# Patient Record
Sex: MALE | Race: WHITE | NOT HISPANIC OR LATINO | Employment: OTHER | URBAN - METROPOLITAN AREA
[De-identification: names, ages, dates, MRNs, and addresses within clinical notes are randomized per-mention and may not be internally consistent; named-entity substitution may affect disease eponyms.]

---

## 2018-07-25 ENCOUNTER — APPOINTMENT (OUTPATIENT)
Dept: PREADMISSION TESTING | Facility: HOSPITAL | Age: 73
End: 2018-07-25

## 2018-07-25 VITALS
WEIGHT: 191.6 LBS | RESPIRATION RATE: 20 BRPM | HEART RATE: 100 BPM | HEIGHT: 63 IN | DIASTOLIC BLOOD PRESSURE: 75 MMHG | OXYGEN SATURATION: 97 % | SYSTOLIC BLOOD PRESSURE: 168 MMHG | BODY MASS INDEX: 33.95 KG/M2 | TEMPERATURE: 98 F

## 2018-07-25 LAB
ANION GAP SERPL CALCULATED.3IONS-SCNC: 15.3 MMOL/L
BUN BLD-MCNC: 24 MG/DL (ref 8–23)
BUN/CREAT SERPL: 17.8 (ref 7–25)
CALCIUM SPEC-SCNC: 9.4 MG/DL (ref 8.6–10.5)
CHLORIDE SERPL-SCNC: 101 MMOL/L (ref 98–107)
CO2 SERPL-SCNC: 25.7 MMOL/L (ref 22–29)
CREAT BLD-MCNC: 1.35 MG/DL (ref 0.76–1.27)
DEPRECATED RDW RBC AUTO: 57.4 FL (ref 37–54)
ERYTHROCYTE [DISTWIDTH] IN BLOOD BY AUTOMATED COUNT: 20 % (ref 11.5–14.5)
GFR SERPL CREATININE-BSD FRML MDRD: 52 ML/MIN/1.73
GLUCOSE BLD-MCNC: 137 MG/DL (ref 65–99)
HCT VFR BLD AUTO: 34 % (ref 40.4–52.2)
HGB BLD-MCNC: 10 G/DL (ref 13.7–17.6)
MCH RBC QN AUTO: 23 PG (ref 27–32.7)
MCHC RBC AUTO-ENTMCNC: 29.4 G/DL (ref 32.6–36.4)
MCV RBC AUTO: 78.3 FL (ref 79.8–96.2)
PLATELET # BLD AUTO: 171 10*3/MM3 (ref 140–500)
PMV BLD AUTO: 10.1 FL (ref 6–12)
POTASSIUM BLD-SCNC: 4 MMOL/L (ref 3.5–5.2)
RBC # BLD AUTO: 4.34 10*6/MM3 (ref 4.6–6)
SODIUM BLD-SCNC: 142 MMOL/L (ref 136–145)
WBC NRBC COR # BLD: 5.34 10*3/MM3 (ref 4.5–10.7)

## 2018-07-25 PROCEDURE — 93010 ELECTROCARDIOGRAM REPORT: CPT | Performed by: INTERNAL MEDICINE

## 2018-07-25 PROCEDURE — 85027 COMPLETE CBC AUTOMATED: CPT | Performed by: OTOLARYNGOLOGY

## 2018-07-25 PROCEDURE — 80076 HEPATIC FUNCTION PANEL: CPT | Performed by: OTOLARYNGOLOGY

## 2018-07-25 PROCEDURE — 93005 ELECTROCARDIOGRAM TRACING: CPT

## 2018-07-25 PROCEDURE — 36415 COLL VENOUS BLD VENIPUNCTURE: CPT

## 2018-07-25 PROCEDURE — 80048 BASIC METABOLIC PNL TOTAL CA: CPT | Performed by: OTOLARYNGOLOGY

## 2018-07-25 RX ORDER — ISOSORBIDE MONONITRATE 60 MG/1
60 TABLET, EXTENDED RELEASE ORAL DAILY
COMMUNITY

## 2018-07-25 RX ORDER — ACETAMINOPHEN,DIPHENHYDRAMINE HCL 500; 25 MG/1; MG/1
1 TABLET, FILM COATED ORAL NIGHTLY PRN
COMMUNITY
End: 2021-01-15

## 2018-07-25 RX ORDER — FUROSEMIDE 80 MG
160 TABLET ORAL 2 TIMES DAILY
COMMUNITY
Start: 2018-02-16

## 2018-07-25 RX ORDER — LISINOPRIL 40 MG/1
40 TABLET ORAL DAILY
COMMUNITY
Start: 2018-02-17 | End: 2021-01-15

## 2018-07-25 RX ORDER — ATORVASTATIN CALCIUM 40 MG/1
40 TABLET, FILM COATED ORAL NIGHTLY
COMMUNITY
Start: 2018-02-16

## 2018-07-25 RX ORDER — OMEPRAZOLE 40 MG/1
40 CAPSULE, DELAYED RELEASE ORAL DAILY
COMMUNITY

## 2018-07-25 RX ORDER — CARVEDILOL 12.5 MG/1
37.5 TABLET ORAL EVERY 12 HOURS
COMMUNITY
Start: 2018-02-20

## 2018-07-25 RX ORDER — ASPIRIN 81 MG/1
81 TABLET, CHEWABLE ORAL DAILY
COMMUNITY
End: 2021-01-15

## 2018-07-25 RX ORDER — DABIGATRAN ETEXILATE 150 MG/1
150 CAPSULE ORAL 2 TIMES DAILY
COMMUNITY
Start: 2018-02-23 | End: 2021-01-15

## 2018-07-25 RX ORDER — ALLOPURINOL 300 MG/1
300 TABLET ORAL DAILY
COMMUNITY
Start: 2018-02-17

## 2018-07-25 NOTE — DISCHARGE INSTRUCTIONS
Take the following medications the morning of surgery with a small sip of water: TAKE CARVEDILOL AND ISOSORBIDE AT HOME;  BRING LISINOPRIL BOTTLE IN TO SURGERY NURSE AND SHE WILL TAKE BLOOD PRESSURE AGAIN TO SEE IF YOU NEED IT.  STOP BEFORE SURGERY ANY ANTIINFLAMMATORY, HERBAL, VITAMIN. ASPIRIN (DONE) AND PRADAXA (7/30 PER PT).  ARRIVE TO THE OUTPATIENT SURGERY DESK THE DAY OF YOUR SURGERY BY 8AM.  CALL DR NEELY OFFICE TO GET ALCOHOL TAPERING ADVICE PRIOR TO SURGERY!    General Instructions:  • Do not eat solid food after midnight the night before surgery.  • You may drink clear liquids day of surgery but must stop at least one hour before your hospital arrival time.  • It is beneficial for you to have a clear drink that contains carbohydrates the day of surgery.  We suggest a 12 to 20 ounce bottle of Gatorade or Powerade for non-diabetic patients or a 12 to 20 ounce bottle of G2 or Powerade Zero for diabetic patients. (Pediatric patients, are not advised to drink a 12 to 20 ounce carbohydrate drink)    Clear liquids are liquids you can see through.  Nothing red in color.     Plain water                               Sports drinks  Sodas                                   Gelatin (Jell-O)  Fruit juices without pulp such as white grape juice and apple juice  Popsicles that contain no fruit or yogurt  Tea or coffee (no cream or milk added)  Gatorade / Powerade  G2 / Powerade Zero    • Infants may have breast milk up to four hours before surgery.  • Infants drinking formula may drink formula up to six hours before surgery.   • Patients who avoid smoking, chewing tobacco and alcohol for 4 weeks prior to surgery have a reduced risk of post-operative complications.  Quit smoking as many days before surgery as you can.  • Do not smoke, use chewing tobacco or drink alcohol the day of surgery.   • If applicable bring your C-PAP/ BI-PAP machine.  • Bring any papers given to you in the doctor’s office.  • Wear clean  comfortable clothes and socks.  • Do not wear contact lenses or make-up.  Bring a case for your glasses.   • Bring crutches or walker if applicable.  • Remove all piercings.  Leave jewelry and any other valuables at home.  • Hair extensions with metal clips must be removed prior to surgery.  • The Pre-Admission Testing nurse will instruct you to bring medications if unable to obtain an accurate list in Pre-Admission Testing.        If you were given a blood bank ID arm band remember to bring it with you the day of surgery.    Preventing a Surgical Site Infection:  • For 2 to 3 days before surgery, avoid shaving with a razor because the razor can irritate skin and make it easier to develop an infection.    • Any areas of open skin can increase the risk of a post-operative wound infection by allowing bacteria to enter and travel throughout the body.  Notify your surgeon if you have any skin wounds / rashes even if it is not near the expected surgical site.  The area will need assessed to determine if surgery should be delayed until it is healed.  • The night prior to surgery sleep in a clean bed with clean clothing.  Do not allow pets to sleep with you.  • Shower on the morning of surgery using a fresh bar of anti-bacterial soap (such as Dial) and clean washcloth.  Dry with a clean towel and dress in clean clothing.  • Ask your surgeon if you will be receiving antibiotics prior to surgery.  • Make sure you, your family, and all healthcare providers clean their hands with soap and water or an alcohol based hand  before caring for you or your wound.    Day of surgery:  Upon arrival, a Pre-op nurse and Anesthesiologist will review your health history, obtain vital signs, and answer questions you may have.  The only belongings needed at this time will be your home medications and if applicable your C-PAP/BI-PAP machine.  If you are staying overnight your family can leave the rest of your belongings in the car and  bring them to your room later.  A Pre-op nurse will start an IV and you may receive medication in preparation for surgery, including something to help you relax.  Your family will be able to see you in the Pre-op area.  While you are in surgery your family should notify the waiting room  if they leave the waiting room area and provide a contact phone number.    Please be aware that surgery does come with discomfort.  We want to make every effort to control your discomfort so please discuss any uncontrolled symptoms with your nurse.   Your doctor will most likely have prescribed pain medications.      If you are going home after surgery you will receive individualized written care instructions before being discharged.  A responsible adult must drive you to and from the hospital on the day of your surgery and stay with you for 24 hours.    If you are staying overnight following surgery, you will be transported to your hospital room following the recovery period.  [facility] has all private rooms.    You have received a list of surgical assistants for your reference.  If you have any questions please call Pre-Admission Testing at [phone].  Deductibles and co-payments are collected on the day of service. Please be prepared to pay the required co-pay, deductible or deposit on the day of service as defined by your plan.

## 2018-07-26 LAB
ALBUMIN SERPL-MCNC: 4.8 G/DL (ref 3.5–5.2)
ALP SERPL-CCNC: 187 U/L (ref 39–117)
ALT SERPL W P-5'-P-CCNC: 22 U/L (ref 1–41)
AST SERPL-CCNC: 25 U/L (ref 1–40)
BILIRUB CONJ SERPL-MCNC: 0.2 MG/DL (ref 0–0.3)
BILIRUB INDIRECT SERPL-MCNC: 0.4 MG/DL
BILIRUB SERPL-MCNC: 0.6 MG/DL (ref 0.1–1.2)
PROT SERPL-MCNC: 7.4 G/DL (ref 6–8.5)

## 2018-08-02 ENCOUNTER — ANESTHESIA EVENT (OUTPATIENT)
Dept: PERIOP | Facility: HOSPITAL | Age: 73
End: 2018-08-02

## 2018-08-02 ENCOUNTER — HOSPITAL ENCOUNTER (OUTPATIENT)
Facility: HOSPITAL | Age: 73
Setting detail: HOSPITAL OUTPATIENT SURGERY
Discharge: HOME | End: 2018-08-02
Attending: OTOLARYNGOLOGY | Admitting: ANESTHESIOLOGY

## 2018-08-02 ENCOUNTER — ANESTHESIA (OUTPATIENT)
Dept: PERIOP | Facility: HOSPITAL | Age: 73
End: 2018-08-02

## 2018-08-02 VITALS
DIASTOLIC BLOOD PRESSURE: 97 MMHG | SYSTOLIC BLOOD PRESSURE: 174 MMHG | OXYGEN SATURATION: 95 % | RESPIRATION RATE: 16 BRPM | TEMPERATURE: 97.7 F | HEART RATE: 99 BPM

## 2018-08-02 DIAGNOSIS — T85.398A: ICD-10-CM

## 2018-08-02 PROCEDURE — 25010000002 MIDAZOLAM PER 1 MG: Performed by: ANESTHESIOLOGY

## 2018-08-02 PROCEDURE — 25010000002 FENTANYL CITRATE (PF) 100 MCG/2ML SOLUTION: Performed by: NURSE ANESTHETIST, CERTIFIED REGISTERED

## 2018-08-02 PROCEDURE — 25010000002 FENTANYL CITRATE (PF) 100 MCG/2ML SOLUTION: Performed by: ANESTHESIOLOGY

## 2018-08-02 PROCEDURE — V2628 FABRICATION & FITTING: HCPCS | Performed by: OTOLARYNGOLOGY

## 2018-08-02 PROCEDURE — 25010000002 PROPOFOL 10 MG/ML EMULSION: Performed by: NURSE ANESTHETIST, CERTIFIED REGISTERED

## 2018-08-02 PROCEDURE — 25010000002 ONDANSETRON PER 1 MG: Performed by: NURSE ANESTHETIST, CERTIFIED REGISTERED

## 2018-08-02 PROCEDURE — 88304 TISSUE EXAM BY PATHOLOGIST: CPT | Performed by: OTOLARYNGOLOGY

## 2018-08-02 PROCEDURE — 25010000002 PHENYLEPHRINE PER 1 ML: Performed by: NURSE ANESTHETIST, CERTIFIED REGISTERED

## 2018-08-02 PROCEDURE — 63710000001 PROMETHAZINE PER 25 MG: Performed by: ANESTHESIOLOGY

## 2018-08-02 DEVICE — IMPLANTABLE DEVICE
Type: IMPLANTABLE DEVICE | Status: FUNCTIONAL
Brand: LARGE STERILE EYE CONFORMER (PMMA)

## 2018-08-02 RX ORDER — ACETAMINOPHEN 650 MG/1
650 SUPPOSITORY RECTAL ONCE AS NEEDED
Status: DISCONTINUED | OUTPATIENT
Start: 2018-08-02 | End: 2018-08-02 | Stop reason: HOSPADM

## 2018-08-02 RX ORDER — SODIUM CHLORIDE, SODIUM LACTATE, POTASSIUM CHLORIDE, CALCIUM CHLORIDE 600; 310; 30; 20 MG/100ML; MG/100ML; MG/100ML; MG/100ML
INJECTION, SOLUTION INTRAVENOUS CONTINUOUS PRN
Status: DISCONTINUED | OUTPATIENT
Start: 2018-08-02 | End: 2018-08-02

## 2018-08-02 RX ORDER — MIDAZOLAM HYDROCHLORIDE 1 MG/ML
2 INJECTION INTRAMUSCULAR; INTRAVENOUS
Status: DISCONTINUED | OUTPATIENT
Start: 2018-08-02 | End: 2018-08-02 | Stop reason: HOSPADM

## 2018-08-02 RX ORDER — FENTANYL CITRATE 50 UG/ML
50 INJECTION, SOLUTION INTRAMUSCULAR; INTRAVENOUS
Status: DISCONTINUED | OUTPATIENT
Start: 2018-08-02 | End: 2018-08-02 | Stop reason: HOSPADM

## 2018-08-02 RX ORDER — MIDAZOLAM HYDROCHLORIDE 1 MG/ML
1 INJECTION INTRAMUSCULAR; INTRAVENOUS
Status: DISCONTINUED | OUTPATIENT
Start: 2018-08-02 | End: 2018-08-02 | Stop reason: HOSPADM

## 2018-08-02 RX ORDER — SODIUM CHLORIDE 9 MG/ML
50 INJECTION, SOLUTION INTRAVENOUS CONTINUOUS PRN
Status: DISCONTINUED | OUTPATIENT
Start: 2018-08-02 | End: 2018-08-02 | Stop reason: HOSPADM

## 2018-08-02 RX ORDER — ROCURONIUM BROMIDE 10 MG/ML
INJECTION, SOLUTION INTRAVENOUS AS NEEDED
Status: DISCONTINUED | OUTPATIENT
Start: 2018-08-02 | End: 2018-08-02 | Stop reason: SURG

## 2018-08-02 RX ORDER — DIPHENHYDRAMINE HYDROCHLORIDE 50 MG/ML
12.5 INJECTION INTRAMUSCULAR; INTRAVENOUS
Status: DISCONTINUED | OUTPATIENT
Start: 2018-08-02 | End: 2018-08-02 | Stop reason: HOSPADM

## 2018-08-02 RX ORDER — LIDOCAINE HYDROCHLORIDE 10 MG/ML
0.5 INJECTION, SOLUTION EPIDURAL; INFILTRATION; INTRACAUDAL; PERINEURAL ONCE AS NEEDED
Status: COMPLETED | OUTPATIENT
Start: 2018-08-02 | End: 2018-08-02

## 2018-08-02 RX ORDER — FENTANYL CITRATE 50 UG/ML
INJECTION, SOLUTION INTRAMUSCULAR; INTRAVENOUS AS NEEDED
Status: DISCONTINUED | OUTPATIENT
Start: 2018-08-02 | End: 2018-08-02 | Stop reason: SURG

## 2018-08-02 RX ORDER — PROMETHAZINE HYDROCHLORIDE 25 MG/ML
6.25 INJECTION, SOLUTION INTRAMUSCULAR; INTRAVENOUS ONCE AS NEEDED
Status: COMPLETED | OUTPATIENT
Start: 2018-08-02 | End: 2018-08-02

## 2018-08-02 RX ORDER — SODIUM CHLORIDE 0.9 % (FLUSH) 0.9 %
1-10 SYRINGE (ML) INJECTION AS NEEDED
Status: DISCONTINUED | OUTPATIENT
Start: 2018-08-02 | End: 2018-08-02 | Stop reason: HOSPADM

## 2018-08-02 RX ORDER — MAGNESIUM HYDROXIDE 1200 MG/15ML
LIQUID ORAL AS NEEDED
Status: DISCONTINUED | OUTPATIENT
Start: 2018-08-02 | End: 2018-08-02 | Stop reason: HOSPADM

## 2018-08-02 RX ORDER — PROPOFOL 10 MG/ML
VIAL (ML) INTRAVENOUS AS NEEDED
Status: DISCONTINUED | OUTPATIENT
Start: 2018-08-02 | End: 2018-08-02 | Stop reason: SURG

## 2018-08-02 RX ORDER — HYDROCODONE BITARTRATE AND ACETAMINOPHEN 7.5; 325 MG/1; MG/1
1 TABLET ORAL ONCE AS NEEDED
Status: DISCONTINUED | OUTPATIENT
Start: 2018-08-02 | End: 2018-08-02 | Stop reason: HOSPADM

## 2018-08-02 RX ORDER — ACETAMINOPHEN 325 MG/1
650 TABLET ORAL ONCE
Status: COMPLETED | OUTPATIENT
Start: 2018-08-02 | End: 2018-08-02

## 2018-08-02 RX ORDER — BACITRACIN 500 [USP'U]/G
OINTMENT OPHTHALMIC AS NEEDED
Status: DISCONTINUED | OUTPATIENT
Start: 2018-08-02 | End: 2018-08-02 | Stop reason: HOSPADM

## 2018-08-02 RX ORDER — ERYTHROMYCIN 5 MG/G
OINTMENT OPHTHALMIC
Qty: 3.5 G | Refills: 1 | Status: SHIPPED | OUTPATIENT
Start: 2018-08-02 | End: 2021-01-15

## 2018-08-02 RX ORDER — ONDANSETRON 2 MG/ML
INJECTION INTRAMUSCULAR; INTRAVENOUS AS NEEDED
Status: DISCONTINUED | OUTPATIENT
Start: 2018-08-02 | End: 2018-08-02 | Stop reason: SURG

## 2018-08-02 RX ORDER — NALOXONE HCL 0.4 MG/ML
0.4 VIAL (ML) INJECTION AS NEEDED
Status: DISCONTINUED | OUTPATIENT
Start: 2018-08-02 | End: 2018-08-02 | Stop reason: HOSPADM

## 2018-08-02 RX ORDER — HYDROCODONE BITARTRATE AND ACETAMINOPHEN 7.5; 325 MG/1; MG/1
1 TABLET ORAL EVERY 6 HOURS PRN
Qty: 15 TABLET | Refills: 0 | Status: SHIPPED | OUTPATIENT
Start: 2018-08-02 | End: 2021-01-15

## 2018-08-02 RX ORDER — PROMETHAZINE HYDROCHLORIDE 25 MG/1
25 TABLET ORAL ONCE AS NEEDED
Status: COMPLETED | OUTPATIENT
Start: 2018-08-02 | End: 2018-08-02

## 2018-08-02 RX ORDER — ACETAMINOPHEN 325 MG/1
650 TABLET ORAL ONCE AS NEEDED
Status: DISCONTINUED | OUTPATIENT
Start: 2018-08-02 | End: 2018-08-02 | Stop reason: HOSPADM

## 2018-08-02 RX ORDER — PROMETHAZINE HYDROCHLORIDE 25 MG/1
25 SUPPOSITORY RECTAL ONCE AS NEEDED
Status: COMPLETED | OUTPATIENT
Start: 2018-08-02 | End: 2018-08-02

## 2018-08-02 RX ADMIN — HYDROCODONE BITARTRATE AND ACETAMINOPHEN 1 TABLET: 7.5; 325 TABLET ORAL at 14:10

## 2018-08-02 RX ADMIN — FENTANYL CITRATE 50 MCG: 50 INJECTION INTRAMUSCULAR; INTRAVENOUS at 12:05

## 2018-08-02 RX ADMIN — PHENYLEPHRINE HYDROCHLORIDE 100 MCG: 10 INJECTION INTRAVENOUS at 11:11

## 2018-08-02 RX ADMIN — SODIUM CHLORIDE: 9 INJECTION, SOLUTION INTRAVENOUS at 10:14

## 2018-08-02 RX ADMIN — ONDANSETRON 4 MG: 2 INJECTION INTRAMUSCULAR; INTRAVENOUS at 11:27

## 2018-08-02 RX ADMIN — MIDAZOLAM 1 MG: 1 INJECTION INTRAMUSCULAR; INTRAVENOUS at 08:28

## 2018-08-02 RX ADMIN — PHENYLEPHRINE HYDROCHLORIDE 100 MCG: 10 INJECTION INTRAVENOUS at 11:15

## 2018-08-02 RX ADMIN — LIDOCAINE HYDROCHLORIDE 0.5 ML: 10 INJECTION, SOLUTION EPIDURAL; INFILTRATION; INTRACAUDAL; PERINEURAL at 08:18

## 2018-08-02 RX ADMIN — PROPOFOL 100 MG: 10 INJECTION, EMULSION INTRAVENOUS at 10:14

## 2018-08-02 RX ADMIN — PROMETHAZINE HYDROCHLORIDE 25 MG: 25 TABLET ORAL at 12:32

## 2018-08-02 RX ADMIN — ACETAMINOPHEN 650 MG: 325 TABLET, FILM COATED ORAL at 08:26

## 2018-08-02 RX ADMIN — FENTANYL CITRATE 25 MCG: 50 INJECTION INTRAMUSCULAR; INTRAVENOUS at 10:14

## 2018-08-02 RX ADMIN — SODIUM CHLORIDE 50 ML/HR: 9 INJECTION, SOLUTION INTRAVENOUS at 08:18

## 2018-08-02 RX ADMIN — SUGAMMADEX 170 MG: 100 INJECTION, SOLUTION INTRAVENOUS at 11:31

## 2018-08-02 RX ADMIN — SODIUM CHLORIDE: 9 INJECTION, SOLUTION INTRAVENOUS at 11:33

## 2018-08-02 RX ADMIN — ROCURONIUM BROMIDE 35 MG: 10 INJECTION INTRAVENOUS at 10:14

## 2018-08-02 RX ADMIN — MIDAZOLAM 1 MG: 1 INJECTION INTRAMUSCULAR; INTRAVENOUS at 08:45

## 2018-08-02 RX ADMIN — FENTANYL CITRATE 50 MCG: 50 INJECTION INTRAMUSCULAR; INTRAVENOUS at 12:10

## 2018-08-02 NOTE — H&P
History & Physical       Patient: Moe Lora    Date of Admission: 8/2/2018  7:18 AM    YOB: 1945    Medical Record Number: [id number]      Chief Complaints: bleeding and discomfort from orbital implant.       History of Present Illness: 73 y.o. male presents with exposure of hydroxyapatite implant, left eye. No new meds/health problems since office visit      Allergies:   Allergies   Allergen Reactions   • Azithromycin Dermatitis   • Clopidogrel Anaphylaxis   • Codeine Dermatitis   • Cephalexin Dermatitis     Pt unsure of this allergy       10 point review of systems negative, except pertaining to the HPI    Medications:   Home Medications:  No current facility-administered medications on file prior to encounter.      No current outpatient prescriptions on file prior to encounter.     Current Medications:  Scheduled Meds:  Continuous Infusions:  No current facility-administered medications for this encounter.   PRN Meds:.    Past Medical History:   Diagnosis Date   • AF (atrial fibrillation) (CMS/[facility])     PT TO HAVE CARDIAC ABLATION AFTER THIS EYE SURGERY 8/2018, BY DR CORTEZ   • AICD (automatic cardioverter/defibrillator) present    • Alcohol abuse    • Arthritis    • Borderline diabetes    • Bradycardia     AICD   • Carotid stenosis    • CHF (congestive heart failure) (CMS/[facility])    • CKD (chronic kidney disease)    • Coronary artery disease    • Depression    • GERD (gastroesophageal reflux disease)    • Hearing loss     HEARING AID BILAT   • Hx of CABG    • Hyperlipidemia    • Hypertension    • Ischemic cardiomyopathy    • LBBB (left bundle branch block)     PER [facility] HX   • MI (myocardial infarction)     SEVERAL   • MRSA carrier     PT STATES NASAL SWAB POSITIVE AT [facility], INFECTION CONTROL CALLED/MARTHA   • On anticoagulant therapy    • Paresthesia     PT WITH OCCASIONAL FINGERTIP TINGLING, CONSTANT NUMBNESS BILAT FEET - PT STATES WORKUP IN PROGRESS FOR VASCULAR BYPASS   • Prosthetic eye globe      LEFT   • Pulmonary nodule     biopsied 2018?   • Sleep apnea with use of continuous positive airway pressure (CPAP)    • Stroke (CMS/HCC)     HAD BRAIN BLEED AFTER HIGH DOSES WARFARIN   • Valvular heart disease    • Vascular disease     LOWER EXTREMITIES        Past Surgical History:   Procedure Laterality Date   • CARDIAC DEFIBRILLATOR PLACEMENT  02/2018    DEAM PLACED FIRST ONE THAT BECAME INFECTED; SECOND PLACED BY DR LAZARO   • CAROTID ENDARTERECTOMY Left    • CORONARY ARTERY BYPASS GRAFT  2006    ABE   • INTRAOCULAR PROSTHESES INSERTION      5 EYE SURGERIES, DR MERIDA [facility]        Social History     Occupational History   • Not on file.     Social History Main Topics   • Smoking status: Former Smoker     Packs/day: 3.00     Years: 40.00     Types: Cigarettes     Quit date: 2005   • Smokeless tobacco: Never Used   • Alcohol use 33.6 oz/week     56 Cans of beer per week      Comment: 8 BEERS A DAY   • Drug use: No   • Sexual activity: Defer    Social History     Social History Narrative   • No narrative on file        Family History   Problem Relation Age of Onset   • Mental illness Neg Hx            Physical Exam   Constitutional: Alert, cooperative, in no acute distress    Head: Normocephalic.   Eyes:   hydroxyapatite implant exposure, left eye  Neck: Normal range of motion.   Cardiovascular: Normal rate.    Pulmonary/Chest: Effort normal.   Neurological: Alert.   Skin: Skin is warm.   Psychiatric: Normal mood and affect.       Assessment/Plan:  The patient voiced understanding of the risks, benefits, and alternative forms of treatment that were discussed and the patient consents to proceed with LEFT REMOVAL OF ORBITAL IMPLANT LEFT SECONDARY VOLUME IMPLANT.       Heber Church Jr, MD

## 2018-08-02 NOTE — OP NOTE
Operative Note    Moe Lora  08/02/18      Pre-op Diagnosis: Extruding left hydroxyapatite anophthalmic implant    Post-op Diagnosis: Same    Procedure(s):  Enucleation of left socket, with reconstruction using autologous dermis-fat graft, and Frost suture OS    Surgeon(s):  Moe Arguello MD      Assistant(s), Heber Church MD, Neo Malhotra MD    Anesthesia: General  MAC    This patient presents with an extruding implant from the previously eviscerated left orbit.    The patient was taken to the operating room where general anesthesia was induced. The patient was then prepped and draped in the usual manner for surgery.    A conjunctival incision was placed horizontally in the left socket and sharp dissection was continued through dense cicatricial tissue around the sclera, filled with a hydroxyapatite implant.  An effort was made to preserve the extraocular muscles as they were released from the sclera, despite the dense cicatrization.  The optic nerve was severed posteriorly, and the implant and sclera were removed.    A dermis-fat graft was then harvested from the umbilical area.  The donor site was closed in multiple layers with 5-0 vicryl and 6-0 chromic sutures.  The graft was then placed into the right orbit where it was sutured with 5-0 vicryl on the anterior surface of the dermis.      A Miles suture was then placed in the orbit after placing the anophthalmic conformer.    The patient was then awakened and taken from the OR in good condition.    Estimated Blood Loss: <50 mL    Specimens:                none      Drains:   none    Complications: none       Moe Arguello MD    Date: 08/02/18

## 2018-08-02 NOTE — ANESTHESIA PREPROCEDURE EVALUATION
Anesthesia Evaluation     no history of anesthetic complications:  NPO Solid Status: > 8 hours             Airway   Mallampati: III  TM distance: <3 FB  Neck ROM: full  Possible difficult intubation  Dental - normal exam     Pulmonary - normal exam   (+) sleep apnea on CPAP,   (-) wheezes  Cardiovascular     ECG reviewed  Rhythm: irregular    (+) pacemaker ICD, hypertension, past MI , CAD, CABG 3-6 Months, dysrhythmias, CHF, PVD, hyperlipidemia,  carotid artery disease  (-) murmur, carotid bruits    ROS comment: Sinus w ectopic atrial beats, LBB  medtronic ICD- shouldn't need interrogation  Low EF    Neuro/Psych  (+) CVA, psychiatric history,     GI/Hepatic/Renal/Endo    (+)  GERD,  renal disease CRI,     ROS Comment: Elevated BUN/Cr- likely from NSAID    Musculoskeletal     Abdominal   (+) obese,    Substance History      OB/GYN          Other                      Anesthesia Plan    ASA 4     general   (  D/W R&B of GA including but not limited to: heart, lung, liver, kidney, neurologic problems, positioning injuries, dental damage, corneal abrasion and TMJ.  .  Needs magnet for ICD, no need for post op interrogation.  Pt understands increased CV risk.)  intravenous induction

## 2018-08-02 NOTE — DISCHARGE INSTRUCTIONS
Restart aspirin on Saturday and the Pradaxa on Sunday verbally per Dr. Arguello.     1) No driving while taking narcotics.   2) Return to school / work after cleared by your surgeon at your post operative visit  3) May shower / sponge bathe 24 hours after surgery  4) Do not lift / push / pull more than 20 lbs.  5) Apply cold compresses to surgical site as much as possible for 2-3 days after surgery  6) Keep your head elevated on 2-3 pillows such that your head is always elevated above the level of your chest  7) Use either the narcotic prescribed or extra-strength tylenol. Do not take at the same time.   8) Do not use NSAIDs for pain, such as: Advil, Naproxen, Motrin, Aleve and Ibuprofen.  9) There is a plastic conformer underneath your eyelids, this should stay in place at all times. If your lids are sutured shut, this will be reversed by the doctor in the office.

## 2018-08-02 NOTE — ANESTHESIA POSTPROCEDURE EVALUATION
Patient: Moe Lora    Procedure Summary     Date:  08/02/18 Room / Location:   [facility]  [facility]  [facility]    Anesthesia Start:  1008 Anesthesia Stop:  1155    Procedure:  LEFT REMOVAL OF ORBITAL IMPLANT LEFT SECONDARY VOLUME IMPLANT, DERMIS FAT GRAFT, DISLA SUTURE (Left Eye) Diagnosis:      Surgeon:  Moe Arguello MD Provider:  Hollis Knight MD    Anesthesia Type:  general ASA Status:  4          Anesthesia Type: general  Last vitals  BP   (!) 170/101 (08/02/18 1417)   Temp   36.5 °C (97.7 °F) (08/02/18 1330)   Pulse   100 (08/02/18 1417)   Resp   16 (08/02/18 1417)     SpO2   94 % (08/02/18 1417)     Post Anesthesia Care and Evaluation    Patient location during evaluation: bedside  Patient participation: complete - patient participated  Level of consciousness: awake  Pain score: 2  Pain management: adequate  Airway patency: patent  Anesthetic complications: No anesthetic complications    Cardiovascular status: acceptable  Respiratory status: acceptable  Hydration status: acceptable    Comments: BP (!) 170/101 (BP Location: Left arm, Patient Position: Lying)   Pulse 100   Temp 36.5 °C (97.7 °F) (Temporal Artery )   Resp 16   SpO2 94%

## 2018-08-03 LAB
LAB AP CASE REPORT: NORMAL
PATH REPORT.FINAL DX SPEC: NORMAL
PATH REPORT.GROSS SPEC: NORMAL

## 2021-01-14 ENCOUNTER — HOSPITAL ENCOUNTER (INPATIENT)
Facility: HOSPITAL | Age: 76
LOS: 1 days | Discharge: HOME | End: 2021-01-19
Attending: EMERGENCY MEDICINE | Admitting: HOSPITALIST

## 2021-01-14 ENCOUNTER — APPOINTMENT (OUTPATIENT)
Dept: GENERAL RADIOLOGY | Facility: HOSPITAL | Age: 76
End: 2021-01-14

## 2021-01-14 DIAGNOSIS — M54.50 CHRONIC LOW BACK PAIN WITHOUT SCIATICA, UNSPECIFIED BACK PAIN LATERALITY: ICD-10-CM

## 2021-01-14 DIAGNOSIS — A41.9 SEPSIS, DUE TO UNSPECIFIED ORGANISM, UNSPECIFIED WHETHER ACUTE ORGAN DYSFUNCTION PRESENT (HCC): ICD-10-CM

## 2021-01-14 DIAGNOSIS — J12.82 PNEUMONIA DUE TO COVID-19 VIRUS: ICD-10-CM

## 2021-01-14 DIAGNOSIS — U07.1 PNEUMONIA DUE TO COVID-19 VIRUS: ICD-10-CM

## 2021-01-14 DIAGNOSIS — I50.9 CONGESTIVE HEART FAILURE, UNSPECIFIED HF CHRONICITY, UNSPECIFIED HEART FAILURE TYPE (HCC): Primary | ICD-10-CM

## 2021-01-14 DIAGNOSIS — G89.29 CHRONIC LOW BACK PAIN WITHOUT SCIATICA, UNSPECIFIED BACK PAIN LATERALITY: ICD-10-CM

## 2021-01-14 PROBLEM — N18.9 ACUTE KIDNEY INJURY SUPERIMPOSED ON CKD (HCC): Status: ACTIVE | Noted: 2021-01-14

## 2021-01-14 PROBLEM — N17.9 ACUTE KIDNEY INJURY SUPERIMPOSED ON CKD (HCC): Status: ACTIVE | Noted: 2021-01-14

## 2021-01-14 LAB
ANION GAP SERPL CALCULATED.3IONS-SCNC: 11 MMOL/L (ref 5–15)
ARTERIAL PATENCY WRIST A: POSITIVE
ATMOSPHERIC PRESS: ABNORMAL MM[HG]
BASE EXCESS BLDA CALC-SCNC: 8 MMOL/L (ref 0–3)
BASOPHILS # BLD AUTO: 0 10*3/MM3 (ref 0–0.2)
BASOPHILS NFR BLD AUTO: 0.4 % (ref 0–1.5)
BDY SITE: ABNORMAL
BUN SERPL-MCNC: 36 MG/DL (ref 8–23)
BUN/CREAT SERPL: 20.1 (ref 7–25)
CALCIUM SPEC-SCNC: 9.6 MG/DL (ref 8.6–10.5)
CHLORIDE SERPL-SCNC: 93 MMOL/L (ref 98–107)
CO2 BLDA-SCNC: 31.7 MMOL/L (ref 22–29)
CO2 SERPL-SCNC: 33 MMOL/L (ref 22–29)
CREAT SERPL-MCNC: 1.79 MG/DL (ref 0.76–1.27)
CRP SERPL-MCNC: 3.01 MG/DL (ref 0–0.5)
D DIMER PPP FEU-MCNC: 1.01 MG/L (FEU) (ref 0–0.59)
D-LACTATE SERPL-SCNC: 1.9 MMOL/L (ref 0.5–2)
DEPRECATED RDW RBC AUTO: 52.9 FL (ref 37–54)
EOSINOPHIL # BLD AUTO: 0 10*3/MM3 (ref 0–0.4)
EOSINOPHIL NFR BLD AUTO: 0 % (ref 0.3–6.2)
ERYTHROCYTE [DISTWIDTH] IN BLOOD BY AUTOMATED COUNT: 17.3 % (ref 12.3–15.4)
GFR SERPL CREATININE-BSD FRML MDRD: 37 ML/MIN/1.73
GLUCOSE SERPL-MCNC: 189 MG/DL (ref 65–99)
HCO3 BLDA-SCNC: 30.6 MMOL/L (ref 21–28)
HCT VFR BLD AUTO: 37.3 % (ref 37.5–51)
HEMODILUTION: NO
HGB BLD-MCNC: 12.1 G/DL (ref 13–17.7)
HOLD SPECIMEN: NORMAL
HOLD SPECIMEN: NORMAL
INHALED O2 CONCENTRATION: 32 %
LYMPHOCYTES # BLD AUTO: 0.1 10*3/MM3 (ref 0.7–3.1)
LYMPHOCYTES NFR BLD AUTO: 1.7 % (ref 19.6–45.3)
MCH RBC QN AUTO: 28.7 PG (ref 26.6–33)
MCHC RBC AUTO-ENTMCNC: 32.5 G/DL (ref 31.5–35.7)
MCV RBC AUTO: 88.4 FL (ref 79–97)
MODALITY: ABNORMAL
MONOCYTES # BLD AUTO: 0.3 10*3/MM3 (ref 0.1–0.9)
MONOCYTES NFR BLD AUTO: 3.8 % (ref 5–12)
NEUTROPHILS NFR BLD AUTO: 6.5 10*3/MM3 (ref 1.7–7)
NEUTROPHILS NFR BLD AUTO: 94.1 % (ref 42.7–76)
NRBC BLD AUTO-RTO: 0 /100 WBC (ref 0–0.2)
NT-PROBNP SERPL-MCNC: ABNORMAL PG/ML (ref 0–900)
PCO2 BLDA: 35 MM HG (ref 35–48)
PH BLDA: 7.55 PH UNITS (ref 7.35–7.45)
PLATELET # BLD AUTO: 128 10*3/MM3 (ref 140–450)
PMV BLD AUTO: 9.8 FL (ref 6–12)
PO2 BLDA: 68.5 MM HG (ref 83–108)
POTASSIUM SERPL-SCNC: 3.7 MMOL/L (ref 3.5–5.2)
PROCALCITONIN SERPL-MCNC: 0.29 NG/ML (ref 0–0.25)
RBC # BLD AUTO: 4.22 10*6/MM3 (ref 4.14–5.8)
SAO2 % BLDCOA: 95.6 % (ref 94–98)
SODIUM SERPL-SCNC: 137 MMOL/L (ref 136–145)
WBC # BLD AUTO: 6.9 10*3/MM3 (ref 3.4–10.8)
WHOLE BLOOD HOLD SPECIMEN: NORMAL

## 2021-01-14 PROCEDURE — 25010000002 LEVOFLOXACIN PER 250 MG: Performed by: EMERGENCY MEDICINE

## 2021-01-14 PROCEDURE — 71045 X-RAY EXAM CHEST 1 VIEW: CPT

## 2021-01-14 PROCEDURE — 83605 ASSAY OF LACTIC ACID: CPT

## 2021-01-14 PROCEDURE — 93005 ELECTROCARDIOGRAM TRACING: CPT | Performed by: INTERNAL MEDICINE

## 2021-01-14 PROCEDURE — 80048 BASIC METABOLIC PNL TOTAL CA: CPT | Performed by: EMERGENCY MEDICINE

## 2021-01-14 PROCEDURE — 99223 1ST HOSP IP/OBS HIGH 75: CPT | Performed by: INTERNAL MEDICINE

## 2021-01-14 PROCEDURE — 83880 ASSAY OF NATRIURETIC PEPTIDE: CPT | Performed by: INTERNAL MEDICINE

## 2021-01-14 PROCEDURE — 85025 COMPLETE CBC W/AUTO DIFF WBC: CPT | Performed by: EMERGENCY MEDICINE

## 2021-01-14 PROCEDURE — 84145 PROCALCITONIN (PCT): CPT | Performed by: INTERNAL MEDICINE

## 2021-01-14 PROCEDURE — 36600 WITHDRAWAL OF ARTERIAL BLOOD: CPT

## 2021-01-14 PROCEDURE — 99285 EMERGENCY DEPT VISIT HI MDM: CPT

## 2021-01-14 PROCEDURE — 86140 C-REACTIVE PROTEIN: CPT | Performed by: INTERNAL MEDICINE

## 2021-01-14 PROCEDURE — 82803 BLOOD GASES ANY COMBINATION: CPT

## 2021-01-14 PROCEDURE — 87040 BLOOD CULTURE FOR BACTERIA: CPT | Performed by: EMERGENCY MEDICINE

## 2021-01-14 PROCEDURE — 85379 FIBRIN DEGRADATION QUANT: CPT | Performed by: INTERNAL MEDICINE

## 2021-01-14 RX ORDER — LEVOFLOXACIN 5 MG/ML
750 INJECTION, SOLUTION INTRAVENOUS ONCE
Status: COMPLETED | OUTPATIENT
Start: 2021-01-14 | End: 2021-01-14

## 2021-01-14 RX ORDER — IBUPROFEN 400 MG/1
800 TABLET ORAL ONCE
Status: COMPLETED | OUTPATIENT
Start: 2021-01-14 | End: 2021-01-14

## 2021-01-14 RX ORDER — ACETAMINOPHEN 500 MG
1000 TABLET ORAL ONCE
Status: COMPLETED | OUTPATIENT
Start: 2021-01-14 | End: 2021-01-14

## 2021-01-14 RX ORDER — SODIUM CHLORIDE 0.9 % (FLUSH) 0.9 %
10 SYRINGE (ML) INJECTION AS NEEDED
Status: DISCONTINUED | OUTPATIENT
Start: 2021-01-14 | End: 2021-01-19 | Stop reason: HOSPADM

## 2021-01-14 RX ADMIN — ACETAMINOPHEN 1000 MG: 500 TABLET, FILM COATED ORAL at 19:50

## 2021-01-14 RX ADMIN — LEVOFLOXACIN 750 MG: 5 INJECTION, SOLUTION INTRAVENOUS at 19:52

## 2021-01-14 RX ADMIN — IBUPROFEN 800 MG: 400 TABLET, FILM COATED ORAL at 19:50

## 2021-01-14 NOTE — ED NOTES
Patient tested positive for Covid 1 week ago. Patient reports he isn't getting any better. Can't stop coughing and has fever on and off.      Bailey Brito, RN  01/14/21 9848

## 2021-01-15 ENCOUNTER — APPOINTMENT (OUTPATIENT)
Dept: ULTRASOUND IMAGING | Facility: HOSPITAL | Age: 76
End: 2021-01-15

## 2021-01-15 LAB
ALBUMIN SERPL-MCNC: 3.8 G/DL (ref 3.5–5.2)
ALP SERPL-CCNC: 278 U/L (ref 39–117)
ALT SERPL W P-5'-P-CCNC: 19 U/L (ref 1–41)
AST SERPL-CCNC: 26 U/L (ref 1–40)
B PARAPERT DNA SPEC QL NAA+PROBE: NOT DETECTED
B PERT DNA SPEC QL NAA+PROBE: NOT DETECTED
BILIRUB CONJ SERPL-MCNC: 0.6 MG/DL (ref 0–0.3)
BILIRUB INDIRECT SERPL-MCNC: 0.5 MG/DL
BILIRUB SERPL-MCNC: 1.1 MG/DL (ref 0–1.2)
C PNEUM DNA NPH QL NAA+NON-PROBE: NOT DETECTED
CK SERPL-CCNC: 52 U/L (ref 20–200)
CRP SERPL-MCNC: 4.42 MG/DL (ref 0–0.5)
ETHANOL UR QL: <0.01 %
FERRITIN SERPL-MCNC: 2056 NG/ML (ref 30–400)
FLUAV SUBTYP SPEC NAA+PROBE: NOT DETECTED
FLUBV RNA ISLT QL NAA+PROBE: NOT DETECTED
GLUCOSE BLDC GLUCOMTR-MCNC: 111 MG/DL (ref 70–105)
GLUCOSE BLDC GLUCOMTR-MCNC: 124 MG/DL (ref 70–105)
GLUCOSE BLDC GLUCOMTR-MCNC: 174 MG/DL (ref 70–105)
GLUCOSE BLDC GLUCOMTR-MCNC: 197 MG/DL (ref 70–105)
HADV DNA SPEC NAA+PROBE: NOT DETECTED
HCOV 229E RNA SPEC QL NAA+PROBE: NOT DETECTED
HCOV HKU1 RNA SPEC QL NAA+PROBE: NOT DETECTED
HCOV NL63 RNA SPEC QL NAA+PROBE: NOT DETECTED
HCOV OC43 RNA SPEC QL NAA+PROBE: NOT DETECTED
HMPV RNA NPH QL NAA+NON-PROBE: NOT DETECTED
HOLD SPECIMEN: NORMAL
HOLD SPECIMEN: NORMAL
HPIV1 RNA SPEC QL NAA+PROBE: NOT DETECTED
HPIV2 RNA SPEC QL NAA+PROBE: NOT DETECTED
HPIV3 RNA NPH QL NAA+PROBE: NOT DETECTED
HPIV4 P GENE NPH QL NAA+PROBE: NOT DETECTED
IRON 24H UR-MRATE: 18 MCG/DL (ref 59–158)
LDH SERPL-CCNC: 413 U/L (ref 135–225)
M PNEUMO IGG SER IA-ACNC: NOT DETECTED
PROT SERPL-MCNC: 6.1 G/DL (ref 6–8.5)
PTH-INTACT SERPL-MCNC: 240.2 PG/ML (ref 15–65)
QT INTERVAL: 427 MS
RHINOVIRUS RNA SPEC NAA+PROBE: NOT DETECTED
RSV RNA NPH QL NAA+NON-PROBE: NOT DETECTED
SARS-COV-2 RNA NPH QL NAA+NON-PROBE: DETECTED
TSH SERPL DL<=0.05 MIU/L-ACNC: 0.34 UIU/ML (ref 0.27–4.2)
URATE SERPL-MCNC: 7.2 MG/DL (ref 3.4–7)
WHOLE BLOOD HOLD SPECIMEN: NORMAL
WHOLE BLOOD HOLD SPECIMEN: NORMAL

## 2021-01-15 PROCEDURE — 63710000001 INSULIN GLARGINE PER 5 UNITS: Performed by: INTERNAL MEDICINE

## 2021-01-15 PROCEDURE — 83615 LACTATE (LD) (LDH) ENZYME: CPT | Performed by: INTERNAL MEDICINE

## 2021-01-15 PROCEDURE — 82077 ASSAY SPEC XCP UR&BREATH IA: CPT | Performed by: INTERNAL MEDICINE

## 2021-01-15 PROCEDURE — 82550 ASSAY OF CK (CPK): CPT | Performed by: INTERNAL MEDICINE

## 2021-01-15 PROCEDURE — 87040 BLOOD CULTURE FOR BACTERIA: CPT | Performed by: INTERNAL MEDICINE

## 2021-01-15 PROCEDURE — 82962 GLUCOSE BLOOD TEST: CPT

## 2021-01-15 PROCEDURE — 0202U NFCT DS 22 TRGT SARS-COV-2: CPT | Performed by: INTERNAL MEDICINE

## 2021-01-15 PROCEDURE — XW033E5 INTRODUCTION OF REMDESIVIR ANTI-INFECTIVE INTO PERIPHERAL VEIN, PERCUTANEOUS APPROACH, NEW TECHNOLOGY GROUP 5: ICD-10-PCS | Performed by: HOSPITALIST

## 2021-01-15 PROCEDURE — 82728 ASSAY OF FERRITIN: CPT | Performed by: INTERNAL MEDICINE

## 2021-01-15 PROCEDURE — 63710000001 DEXAMETHASONE PER 0.25 MG: Performed by: INTERNAL MEDICINE

## 2021-01-15 PROCEDURE — 84155 ASSAY OF PROTEIN SERUM: CPT | Performed by: INTERNAL MEDICINE

## 2021-01-15 PROCEDURE — G0378 HOSPITAL OBSERVATION PER HR: HCPCS

## 2021-01-15 PROCEDURE — 83970 ASSAY OF PARATHORMONE: CPT | Performed by: INTERNAL MEDICINE

## 2021-01-15 PROCEDURE — 84443 ASSAY THYROID STIM HORMONE: CPT | Performed by: INTERNAL MEDICINE

## 2021-01-15 PROCEDURE — 84165 PROTEIN E-PHORESIS SERUM: CPT | Performed by: INTERNAL MEDICINE

## 2021-01-15 PROCEDURE — 83540 ASSAY OF IRON: CPT | Performed by: INTERNAL MEDICINE

## 2021-01-15 PROCEDURE — 76775 US EXAM ABDO BACK WALL LIM: CPT

## 2021-01-15 PROCEDURE — 80076 HEPATIC FUNCTION PANEL: CPT | Performed by: INTERNAL MEDICINE

## 2021-01-15 PROCEDURE — 84550 ASSAY OF BLOOD/URIC ACID: CPT | Performed by: INTERNAL MEDICINE

## 2021-01-15 PROCEDURE — 86140 C-REACTIVE PROTEIN: CPT | Performed by: INTERNAL MEDICINE

## 2021-01-15 PROCEDURE — 99233 SBSQ HOSP IP/OBS HIGH 50: CPT | Performed by: INTERNAL MEDICINE

## 2021-01-15 RX ORDER — HYDROCODONE BITARTRATE AND ACETAMINOPHEN 7.5; 325 MG/1; MG/1
1 TABLET ORAL EVERY 6 HOURS PRN
Status: DISCONTINUED | OUTPATIENT
Start: 2021-01-15 | End: 2021-01-19 | Stop reason: HOSPADM

## 2021-01-15 RX ORDER — SODIUM CHLORIDE 9 MG/ML
60 INJECTION, SOLUTION INTRAVENOUS CONTINUOUS
Status: DISCONTINUED | OUTPATIENT
Start: 2021-01-15 | End: 2021-01-18

## 2021-01-15 RX ORDER — ISOSORBIDE MONONITRATE 30 MG/1
30 TABLET, EXTENDED RELEASE ORAL DAILY
Status: DISCONTINUED | OUTPATIENT
Start: 2021-01-15 | End: 2021-01-19 | Stop reason: HOSPADM

## 2021-01-15 RX ORDER — ONDANSETRON 2 MG/ML
4 INJECTION INTRAMUSCULAR; INTRAVENOUS EVERY 6 HOURS PRN
Status: DISCONTINUED | OUTPATIENT
Start: 2021-01-15 | End: 2021-01-19 | Stop reason: HOSPADM

## 2021-01-15 RX ORDER — DEXAMETHASONE 6 MG/1
6 TABLET ORAL
Status: DISCONTINUED | OUTPATIENT
Start: 2021-01-15 | End: 2021-01-19 | Stop reason: HOSPADM

## 2021-01-15 RX ORDER — ASCORBIC ACID 500 MG
500 TABLET ORAL DAILY
Status: DISCONTINUED | OUTPATIENT
Start: 2021-01-15 | End: 2021-01-15

## 2021-01-15 RX ORDER — INSULIN GLARGINE 100 [IU]/ML
10 INJECTION, SOLUTION SUBCUTANEOUS NIGHTLY
Status: DISCONTINUED | OUTPATIENT
Start: 2021-01-15 | End: 2021-01-19 | Stop reason: HOSPADM

## 2021-01-15 RX ORDER — DABIGATRAN ETEXILATE 150 MG/1
150 CAPSULE ORAL 2 TIMES DAILY
Status: DISCONTINUED | OUTPATIENT
Start: 2021-01-15 | End: 2021-01-15

## 2021-01-15 RX ORDER — LORAZEPAM 0.5 MG/1
0.5 TABLET ORAL EVERY 8 HOURS
Status: DISPENSED | OUTPATIENT
Start: 2021-01-16 | End: 2021-01-17

## 2021-01-15 RX ORDER — ALOGLIPTIN 6.25 MG/1
1 TABLET, FILM COATED ORAL DAILY
COMMUNITY

## 2021-01-15 RX ORDER — PANTOPRAZOLE SODIUM 40 MG/1
40 TABLET, DELAYED RELEASE ORAL EVERY MORNING
Status: DISCONTINUED | OUTPATIENT
Start: 2021-01-15 | End: 2021-01-15

## 2021-01-15 RX ORDER — LOSARTAN POTASSIUM 50 MG/1
50 TABLET ORAL DAILY
COMMUNITY
End: 2021-01-19 | Stop reason: HOSPADM

## 2021-01-15 RX ORDER — LORAZEPAM 2 MG/ML
1 INJECTION INTRAMUSCULAR
Status: DISCONTINUED | OUTPATIENT
Start: 2021-01-15 | End: 2021-01-19 | Stop reason: HOSPADM

## 2021-01-15 RX ORDER — ACETAMINOPHEN 325 MG/1
650 TABLET ORAL EVERY 4 HOURS PRN
Status: DISCONTINUED | OUTPATIENT
Start: 2021-01-15 | End: 2021-01-19 | Stop reason: HOSPADM

## 2021-01-15 RX ORDER — LORAZEPAM 1 MG/1
1 TABLET ORAL
Status: DISCONTINUED | OUTPATIENT
Start: 2021-01-15 | End: 2021-01-19 | Stop reason: HOSPADM

## 2021-01-15 RX ORDER — ASCORBIC ACID 500 MG
500 TABLET ORAL EVERY 6 HOURS SCHEDULED
Status: DISCONTINUED | OUTPATIENT
Start: 2021-01-15 | End: 2021-01-19 | Stop reason: HOSPADM

## 2021-01-15 RX ORDER — LORAZEPAM 0.5 MG/1
0.5 TABLET ORAL EVERY 6 HOURS
Status: DISPENSED | OUTPATIENT
Start: 2021-01-15 | End: 2021-01-16

## 2021-01-15 RX ORDER — FAMOTIDINE 20 MG/1
20 TABLET, FILM COATED ORAL DAILY
Status: DISCONTINUED | OUTPATIENT
Start: 2021-01-15 | End: 2021-01-19 | Stop reason: HOSPADM

## 2021-01-15 RX ORDER — IVERMECTIN 3 MG/1
12 TABLET ORAL ONCE
Status: COMPLETED | OUTPATIENT
Start: 2021-01-15 | End: 2021-01-15

## 2021-01-15 RX ORDER — ALLOPURINOL 300 MG/1
300 TABLET ORAL DAILY
Status: DISCONTINUED | OUTPATIENT
Start: 2021-01-15 | End: 2021-01-18

## 2021-01-15 RX ORDER — LANOLIN ALCOHOL/MO/W.PET/CERES
1000 CREAM (GRAM) TOPICAL DAILY
Status: DISCONTINUED | OUTPATIENT
Start: 2021-01-15 | End: 2021-01-19 | Stop reason: HOSPADM

## 2021-01-15 RX ORDER — INSULIN LISPRO 100 [IU]/ML
0-9 INJECTION, SOLUTION INTRAVENOUS; SUBCUTANEOUS AS NEEDED
Status: DISCONTINUED | OUTPATIENT
Start: 2021-01-15 | End: 2021-01-19 | Stop reason: HOSPADM

## 2021-01-15 RX ORDER — GUAIFENESIN/DEXTROMETHORPHAN 100-10MG/5
10 SYRUP ORAL EVERY 4 HOURS PRN
Status: DISCONTINUED | OUTPATIENT
Start: 2021-01-15 | End: 2021-01-19 | Stop reason: HOSPADM

## 2021-01-15 RX ORDER — LORAZEPAM 0.5 MG/1
0.5 TABLET ORAL
Status: DISCONTINUED | OUTPATIENT
Start: 2021-01-15 | End: 2021-01-19 | Stop reason: HOSPADM

## 2021-01-15 RX ORDER — ASPIRIN 81 MG/1
81 TABLET, CHEWABLE ORAL DAILY
Status: DISCONTINUED | OUTPATIENT
Start: 2021-01-15 | End: 2021-01-19 | Stop reason: HOSPADM

## 2021-01-15 RX ORDER — FAMOTIDINE 20 MG/1
20 TABLET, FILM COATED ORAL
Status: DISCONTINUED | OUTPATIENT
Start: 2021-01-15 | End: 2021-01-15

## 2021-01-15 RX ORDER — LEVOFLOXACIN 5 MG/ML
750 INJECTION, SOLUTION INTRAVENOUS
Status: DISCONTINUED | OUTPATIENT
Start: 2021-01-16 | End: 2021-01-18

## 2021-01-15 RX ORDER — DEXTROSE MONOHYDRATE 25 G/50ML
25 INJECTION, SOLUTION INTRAVENOUS
Status: DISCONTINUED | OUTPATIENT
Start: 2021-01-15 | End: 2021-01-19 | Stop reason: HOSPADM

## 2021-01-15 RX ORDER — ALBUTEROL SULFATE 90 UG/1
2 AEROSOL, METERED RESPIRATORY (INHALATION) EVERY 4 HOURS PRN
COMMUNITY

## 2021-01-15 RX ORDER — MULTIVITAMIN WITH IRON
100 TABLET ORAL DAILY
Status: DISCONTINUED | OUTPATIENT
Start: 2021-01-15 | End: 2021-01-19 | Stop reason: HOSPADM

## 2021-01-15 RX ORDER — ZINC SULFATE 50(220)MG
440 CAPSULE ORAL DAILY
Status: DISCONTINUED | OUTPATIENT
Start: 2021-01-15 | End: 2021-01-19 | Stop reason: HOSPADM

## 2021-01-15 RX ORDER — SODIUM CHLORIDE 0.9 % (FLUSH) 0.9 %
10 SYRINGE (ML) INJECTION AS NEEDED
Status: DISCONTINUED | OUTPATIENT
Start: 2021-01-15 | End: 2021-01-19 | Stop reason: HOSPADM

## 2021-01-15 RX ORDER — DOXYCYCLINE 100 MG/1
100 TABLET ORAL EVERY 12 HOURS SCHEDULED
Status: DISCONTINUED | OUTPATIENT
Start: 2021-01-15 | End: 2021-01-19 | Stop reason: HOSPADM

## 2021-01-15 RX ORDER — NICOTINE POLACRILEX 4 MG
15 LOZENGE BUCCAL
Status: DISCONTINUED | OUTPATIENT
Start: 2021-01-15 | End: 2021-01-19 | Stop reason: HOSPADM

## 2021-01-15 RX ORDER — ZINC SULFATE 50(220)MG
220 CAPSULE ORAL DAILY
Status: DISCONTINUED | OUTPATIENT
Start: 2021-01-15 | End: 2021-01-15

## 2021-01-15 RX ORDER — ATORVASTATIN CALCIUM 40 MG/1
40 TABLET, FILM COATED ORAL NIGHTLY
Status: DISCONTINUED | OUTPATIENT
Start: 2021-01-15 | End: 2021-01-19 | Stop reason: HOSPADM

## 2021-01-15 RX ORDER — SODIUM CHLORIDE 0.9 % (FLUSH) 0.9 %
10 SYRINGE (ML) INJECTION EVERY 12 HOURS SCHEDULED
Status: DISCONTINUED | OUTPATIENT
Start: 2021-01-15 | End: 2021-01-19 | Stop reason: HOSPADM

## 2021-01-15 RX ORDER — ASPIRIN 81 MG/1
81 TABLET ORAL DAILY
COMMUNITY

## 2021-01-15 RX ORDER — LORAZEPAM 2 MG/ML
0.5 INJECTION INTRAMUSCULAR
Status: DISCONTINUED | OUTPATIENT
Start: 2021-01-15 | End: 2021-01-19 | Stop reason: HOSPADM

## 2021-01-15 RX ORDER — MELATONIN
1000 DAILY
Status: DISCONTINUED | OUTPATIENT
Start: 2021-01-15 | End: 2021-01-19 | Stop reason: HOSPADM

## 2021-01-15 RX ORDER — INSULIN LISPRO 100 [IU]/ML
0-9 INJECTION, SOLUTION INTRAVENOUS; SUBCUTANEOUS
Status: DISCONTINUED | OUTPATIENT
Start: 2021-01-15 | End: 2021-01-19 | Stop reason: HOSPADM

## 2021-01-15 RX ADMIN — Medication 100 MG: at 14:53

## 2021-01-15 RX ADMIN — DOXYCYCLINE 100 MG: 100 TABLET, FILM COATED ORAL at 21:38

## 2021-01-15 RX ADMIN — ZINC SULFATE 220 MG (50 MG) CAPSULE 440 MG: CAPSULE at 14:54

## 2021-01-15 RX ADMIN — INSULIN GLARGINE 10 UNITS: 100 INJECTION, SOLUTION SUBCUTANEOUS at 21:39

## 2021-01-15 RX ADMIN — Medication 10 ML: at 01:15

## 2021-01-15 RX ADMIN — CYANOCOBALAMIN TAB 1000 MCG 1000 MCG: 1000 TAB at 14:57

## 2021-01-15 RX ADMIN — ASPIRIN 81 MG: 81 TABLET, CHEWABLE ORAL at 14:52

## 2021-01-15 RX ADMIN — IVERMECTIN 12 MG: 3 TABLET ORAL at 14:54

## 2021-01-15 RX ADMIN — Medication 10 ML: at 10:43

## 2021-01-15 RX ADMIN — ACETAMINOPHEN 650 MG: 325 TABLET, FILM COATED ORAL at 10:42

## 2021-01-15 RX ADMIN — Medication 1000 UNITS: at 14:56

## 2021-01-15 RX ADMIN — DEXAMETHASONE 6 MG: 4 TABLET ORAL at 14:52

## 2021-01-15 RX ADMIN — ATORVASTATIN CALCIUM 40 MG: 40 TABLET, FILM COATED ORAL at 03:18

## 2021-01-15 RX ADMIN — SODIUM CHLORIDE 60 ML/HR: 9 INJECTION, SOLUTION INTRAVENOUS at 15:51

## 2021-01-15 RX ADMIN — Medication 100 MG: at 14:54

## 2021-01-15 RX ADMIN — OXYCODONE HYDROCHLORIDE AND ACETAMINOPHEN 500 MG: 500 TABLET ORAL at 14:55

## 2021-01-15 RX ADMIN — FAMOTIDINE 20 MG: 20 TABLET, FILM COATED ORAL at 14:53

## 2021-01-15 RX ADMIN — ACETAMINOPHEN 650 MG: 325 TABLET, FILM COATED ORAL at 15:58

## 2021-01-15 RX ADMIN — OXYCODONE HYDROCHLORIDE AND ACETAMINOPHEN 500 MG: 500 TABLET ORAL at 17:31

## 2021-01-15 RX ADMIN — CARVEDILOL 37.5 MG: 25 TABLET, FILM COATED ORAL at 10:42

## 2021-01-15 RX ADMIN — OXYCODONE HYDROCHLORIDE AND ACETAMINOPHEN 500 MG: 500 TABLET ORAL at 21:38

## 2021-01-15 RX ADMIN — Medication 10 ML: at 21:39

## 2021-01-15 RX ADMIN — INSULIN GLARGINE 10 UNITS: 100 INJECTION, SOLUTION SUBCUTANEOUS at 03:17

## 2021-01-15 RX ADMIN — DOXYCYCLINE 100 MG: 100 TABLET, FILM COATED ORAL at 14:54

## 2021-01-15 RX ADMIN — ALLOPURINOL 300 MG: 300 TABLET ORAL at 14:55

## 2021-01-15 RX ADMIN — LORAZEPAM 0.5 MG: 0.5 TABLET ORAL at 14:53

## 2021-01-15 RX ADMIN — SODIUM CHLORIDE 200 MG: 9 INJECTION, SOLUTION INTRAVENOUS at 15:51

## 2021-01-15 RX ADMIN — CARVEDILOL 37.5 MG: 25 TABLET, FILM COATED ORAL at 03:18

## 2021-01-15 RX ADMIN — ATORVASTATIN CALCIUM 40 MG: 40 TABLET, FILM COATED ORAL at 21:38

## 2021-01-15 NOTE — CONSULTS
NEPHROLOGY CONSULTATION-----[facility]    [facility]  [phone]  Anastacia Gallegos MD    Patient Care Team:  Provider, No Known as PCP - Felicitas Poole MD as Consulting Physician (Nephrology)    CC/REASON FOR CONSULTATION: RENAL FAILURE/ELEVATED SERUM CREATININE    PHYSICIAN REQUESTING CONSULTATION:     History of Present Illness     HPI    Patient is a 75 y.o. WM whom I was asked to see in consultation for evaluation and management of renal failure/elevated serum creatinine. Patient was admitted after presenting to ER with complaints of SOB over the past few days. Patient reports being tested and being found to be COVID 19 positive prior to admission. [facility] that he has some CKD and a couple of nonobstructive stones in the past. No NSAIDs or recent IV dye exposure. No known h/o hepatitis, TB, rheumatic fever, jaundice, SLE, bleeding/bruising disorders. Some urinary hesitancy.  No edema or fluid retention.  +Compliance with home meds. Was on diuretics in the form of  Lasix prior to admission. Was not on ACE-I/ARB in prior to admission. No herbal med use. +Diaphroesis      Review of Systems   Constitutional: Positive for activity change and fatigue. Negative for appetite change, chills, diaphoresis, fever and unexpected weight change.   HENT: Negative for congestion, dental problem, drooling, ear discharge, ear pain, facial swelling, hearing loss, mouth sores, nosebleeds, postnasal drip, rhinorrhea, sinus pressure, sinus pain, sneezing, sore throat, tinnitus, trouble swallowing and voice change.    Eyes: Negative for photophobia, pain, discharge, redness, itching and visual disturbance.   Respiratory: Positive for shortness of breath. Negative for apnea, cough, choking, chest tightness, wheezing and stridor.    Cardiovascular: Positive for leg swelling. Negative for chest pain and palpitations.    Gastrointestinal: Negative for abdominal distention, abdominal pain, anal bleeding, blood in stool, constipation, diarrhea, nausea, rectal pain and vomiting.   Endocrine: Negative for cold intolerance, heat intolerance, polydipsia, polyphagia and polyuria.   Genitourinary: Positive for frequency. Negative for decreased urine volume, difficulty urinating, dysuria, enuresis, flank pain, genital sores, hematuria and urgency.   Musculoskeletal: Positive for arthralgias and back pain. Negative for gait problem, joint swelling, myalgias, neck pain and neck stiffness.   Skin: Negative for color change, pallor, rash and wound.   Allergic/Immunologic: Negative for environmental allergies, food allergies and immunocompromised state.   Neurological: Positive for weakness. Negative for dizziness, tremors, seizures, syncope, facial asymmetry, speech difficulty, light-headedness, numbness and headaches.   Hematological: Negative for adenopathy. Does not bruise/bleed easily.   Psychiatric/Behavioral: Negative for agitation, behavioral problems, confusion, decreased concentration, dysphoric mood, hallucinations, self-injury, sleep disturbance and suicidal ideas. The patient is not nervous/anxious and is not hyperactive.           Past Medical History:   Diagnosis Date   • AF (atrial fibrillation) (CMS/[facility])     PT TO HAVE CARDIAC ABLATION AFTER THIS EYE SURGERY 8/2018, BY DR CORTEZ   • AICD (automatic cardioverter/defibrillator) present    • Alcohol abuse    • Arthritis    • Borderline diabetes    • Bradycardia     AICD   • Carotid stenosis    • CHF (congestive heart failure) (CMS/[facility])    • CKD (chronic kidney disease)    • Coronary artery disease    • Depression    • Diabetes (CMS/[facility])    • GERD (gastroesophageal reflux disease)    • Hearing loss     HEARING AID BILAT   • Hx of CABG    • Hyperlipidemia    • Hypertension    • Ischemic cardiomyopathy    • LBBB (left bundle branch block)     PER [facility] HX   • MI (myocardial  infarction) (CMS/H[facility])     SEVERAL   • MRSA carrier     [facility], [facility]FECTION CONTROL CALLED/MARTHA   • On anticoagulant therapy    • Paresthesia     PT WITH OCCASIONAL FINGERTIP TINGLING, CONSTANT NUMBNESS BILAT FEET - PT STATES WORKUP IN PROGRESS FOR VASCULAR BYPASS   • Prosthetic eye globe     LEFT   • Pulmonary nodule     biopsied ?   • Sleep apnea with use of continuous positive airway pressure (CPAP)    • Stroke (CMS/[facility])     HAD BRAIN BLEED AFTER HIGH DOSES WARFARIN   • Valvular heart disease    • Vascular disease     LOWER EXTREMITIES       Past Surgical History:   Procedure Laterality Date   • CARDIAC DEFIBRILLATOR PLACEMENT  2018    DEAM PLACED FIRST ONE THAT BECAME INFECTED; SECOND PLACED BY DR LAZARO   • CAROTID ENDARTERECTOMY Left    • CORONARY ARTERY BYPASS GRAFT      ABE   • EYE ENUCLEATION Left 2018    Procedure: LEFT REMOVAL OF ORBITAL IMPLANT LEFT SECONDARY VOLUME IMPLANT, DERMIS FAT GRAFT, DISLA SUTURE;  Surgeon: Moe Arguello MD;  [facility];  Service: Ophthalmology   • INTRAOCULAR PROSTHESES INSERTION      5 EYE SURGERIES, DR MERIDA UJULIETH VALENZUELA       Family History   Problem Relation Age of Onset   • Mental illness Neg Hx        Social History     Tobacco Use   • Smoking status: Former Smoker     Packs/day: 3.00     Years: 40.00     Pack years: 120.00     Types: Cigarettes     Quit date:      Years since quittin.0   • Smokeless tobacco: Never Used   Substance Use Topics   • Alcohol use: Yes     Alcohol/week: 56.0 standard drinks     Types: 56 Cans of beer per week     Comment: 8 BEERS A DAY   • Drug use: No       Home Meds: (Not in a hospital admission)      Scheduled Meds:  allopurinol, 300 mg, Oral, Daily  ascorbic acid, 500 mg, Oral, Q6H  aspirin, 81 mg, Oral, Daily  atorvastatin, 40 mg, Oral, Nightly  carvedilol, 37.5 mg, Oral, Q12H  cholecalciferol, 1,000 Units, Oral, Daily  dexamethasone, 6 mg, Oral, Daily With  Breakfast  doxycycline, 100 mg, Oral, Q12H  famotidine, 20 mg, Oral, BID AC  insulin glargine, 10 Units, Subcutaneous, Nightly  insulin lispro, 0-9 Units, Subcutaneous, TID AC  isosorbide mononitrate, 30 mg, Oral, Daily  ivermectin, 12 mg, Oral, Once  [START ON 1/16/2021] levoFLOXacin, 750 mg, Intravenous, Q48H  LORazepam, 0.5 mg, Oral, Q6H    Followed by  [START ON 1/16/2021] LORazepam, 0.5 mg, Oral, Q8H  remdesivir, 200 mg, Intravenous, Q24H    Followed by  [START ON 1/16/2021] remdesivir, 100 mg, Intravenous, Q24H  sodium chloride, 10 mL, Intravenous, Q12H  thiamine, 100 mg, Oral, Daily  vitamin B-12, 1,000 mcg, Oral, Daily  vitamin B-6, 100 mg, Oral, Daily  vitamin D3, 5,000 Units, Oral, Daily  zinc sulfate, 440 mg, Oral, Daily        Continuous Infusions:  Pharmacy Consult - Remdesivir,   Pharmacy Consult - Steroid Insulin Protocol,         PRN Meds:  •  acetaminophen  •  dextrose  •  dextrose  •  glucagon (human recombinant)  •  guaiFENesin-dextromethorphan  •  HYDROcodone-acetaminophen  •  insulin lispro **AND** insulin lispro  •  LORazepam **OR** LORazepam **OR** LORazepam **OR** LORazepam **OR** LORazepam **OR** LORazepam  •  ondansetron  •  Pharmacy Consult - Remdesivir  •  Pharmacy Consult - Steroid Insulin Protocol  •  [COMPLETED] Insert peripheral IV **AND** sodium chloride  •  sodium chloride    Allergies:  Azithromycin, Clopidogrel, Codeine, and Cephalexin    OBJECTIVE    Vital Signs  Temp:  [97.7 °F (36.5 °C)-103.1 °F (39.5 °C)] 99.8 °F (37.7 °C)  Heart Rate:  [70-99] 87  Resp:  [18-20] 19  BP: (102-128)/(57-75) 121/75    No intake/output data recorded.  I/O last 3 completed shifts:  In: 150 [IV Piggyback:150]  Out: -     Physical Exam:  General Appearance: alert, appears stated age and cooperative  Head: normocephalic, without obvious abnormality and atraumatic +DRY OP  Eyes: conjunctivae and sclerae normal and no icterus  Neck: supple and NO JVD  Lungs: +BIBASILAR RALES. NO CRACKLES  Heart: regular  rhythm & normal rate and normal S1, S2 +PARUL  Chest Wall: no abnormalities observed  Abdomen: normal bowel sounds and soft non-tender  Extremities: moves extremities well, NO SIGNIFICANT PRETIBIAL EDEMA BILAT, no cyanosis  Skin: no bleeding, bruising or rash  Neurologic: Alert, and oriented. No focal deficits    Results Review:    I reviewed the patient's new clinical results.    WBC WBC   Date Value Ref Range Status   01/14/2021 6.90 3.40 - 10.80 10*3/mm3 Final      HGB Hemoglobin   Date Value Ref Range Status   01/14/2021 12.1 (L) 13.0 - 17.7 g/dL Final      HCT Hematocrit   Date Value Ref Range Status   01/14/2021 37.3 (L) 37.5 - 51.0 % Final      Platlets No results found for: LABPLAT   MCV MCV   Date Value Ref Range Status   01/14/2021 88.4 79.0 - 97.0 fL Final          Sodium Sodium   Date Value Ref Range Status   01/14/2021 137 136 - 145 mmol/L Final      Potassium Potassium   Date Value Ref Range Status   01/14/2021 3.7 3.5 - 5.2 mmol/L Final      Chloride Chloride   Date Value Ref Range Status   01/14/2021 93 (L) 98 - 107 mmol/L Final      CO2 CO2   Date Value Ref Range Status   01/14/2021 33.0 (H) 22.0 - 29.0 mmol/L Final      BUN BUN   Date Value Ref Range Status   01/14/2021 36 (H) 8 - 23 mg/dL Final      Creatinine Creatinine   Date Value Ref Range Status   01/14/2021 1.79 (H) 0.76 - 1.27 mg/dL Final      Calcium Calcium   Date Value Ref Range Status   01/14/2021 9.6 8.6 - 10.5 mg/dL Final      PO4 No results found for: CAPO4   Albumin Albumin   Date Value Ref Range Status   01/15/2021 3.80 3.50 - 5.20 g/dL Final      Magnesium No results found for: MG   Uric Acid No results found for: URICACID       Imaging Results (Last 72 Hours)     Procedure Component Value Units Date/Time    XR Chest 1 View [[id number]] Collected: 01/14/21 1900     Updated: 01/14/21 1903    Narrative:      Examination: XR CHEST 1 VW-     Date of Exam: 1/14/2021 6:36 PM     Indication: Dyspnea.       Comparison: None available.      Technique: 1 view of the chest      FINDINGS:  There is cardiomegaly. There has been a coronary artery bypass. There  are 3 pacer leads in the heart. There our moderate airspace opacities in  the left mid-lower lung and in the right lower lung. Right pleural  effusion is difficult to exclude. Negative for pneumothorax. No bone  abnormality noted.       Impression:      1. There are bilateral airspace opacities which are consistent with  pneumonia and/or pulmonary edema.  2. Cardiomegaly.     Electronically Signed By-Kristi Menon MD On:1/14/2021 7:01 PM  This report was finalized on [id number] by  Kristi Menon MD.            Results for orders placed during the hospital encounter of 01/14/21   XR Chest 1 View    Narrative Examination: XR CHEST 1 VW-     Date of Exam: 1/14/2021 6:36 PM     Indication: Dyspnea.       Comparison: None available.     Technique: 1 view of the chest      FINDINGS:  There is cardiomegaly. There has been a coronary artery bypass. There  are 3 pacer leads in the heart. There our moderate airspace opacities in  the left mid-lower lung and in the right lower lung. Right pleural  effusion is difficult to exclude. Negative for pneumothorax. No bone  abnormality noted.       Impression 1. There are bilateral airspace opacities which are consistent with  pneumonia and/or pulmonary edema.  2. Cardiomegaly.     Electronically Signed By-Kristi Menon MD On:1/14/2021 7:01 PM  This report was finalized on [id number] by  Kristi Menon MD.              ASSESSMENT / PLAN      Sepsis (CMS/[facility])    Acute kidney injury superimposed on CKD (CMS/[facility])    1. RENAL FAILURE-------Nonoliguric. +ARF/SAMIR on top of known CRF/CKD STG 3A with what appears to be a baseline creatinine of 1.3 based on review of old labs. Unknown if patient has baseline proteinuria or hematuria. CRF/CKD STG 3A is likely secondary to DGS vs. HTN NS. +ARF/SAMIR is secondary to prerenal state/intravascular volume depletion with  concomitant Lasix use (BNP is clinically inaccurate) and hemodynamic fluctuation from decompensated Pulmonary state. Hold Lasix. Hydrate GENTLY. Check urine and serum studies and renal US and PVR. No NSAIDs or IV dye. D/C prn Ibuprofen. Dose meds for CrCl less than 10 cc/min until ARF/SAMIR is resolved.     2. COVID 19 + PNA-------Blood cx pending. Repeat COVID test pending. Qualifying for Remdesivir. Per PCP/Pulmonary. On Zinc, Vitamin D, Vitamin B1/B-12/B-6. On Levaquin also    3. ANEMIA-------Check Fe and SPEP. No IV iron given markedly elevated Ferritin from COVID 19 + state    4. METABOLIC ALKALOSIS------Hold Lasix. Hydrate and follow    5. OA/DJD------No NSAIDs. Check Uric acid level. On Allopurinol    6. BPH AND H/O NEPHROLITHIASIS-------Check PVR and Renal US    7. H/O CHF/ISCHEMIC CM-------Currently without gross overload clinically despite elevated BNP. Hold Lasix and hydrate GENTLY for reasons stated above    8. DMII-------BS okay. Glucometers, SSI    9. GERD/PUD PROPHYLAXIS------Renal dose adjusted Pepcid    10. HYPERLIPIDEMIA-----On Statin. Check CK, TSH    11. ELEVATED D-DIMER/DVT PROPHYLAXIS-----SCD b/c of thrombocytopenia (Per PCP)    12. THROMBOCYTOPENIA-----Check SPEP    I discussed the patients findings and my recommendations with patient and nursing staff    Will follow along closely. Thank you for allowing us to see this patient in renal consultation.    [facility]  [phone]  MD Anastacia Castillo MD  01/15/21  12:15 EST

## 2021-01-15 NOTE — ED PROVIDER NOTES
Subjective   Patient is a 75-year-old male complaining of increasing shortness of breath over the past several days.  States shortness of breath is much worse on exertion.  Patient was diagnosed with Covid and symptomatic over the past 7 to 8 days.  Denies fever chest pain vomiting or other complaint          Review of Systems  Negative for headache earache sore throat fever chest pain vomiting diarrhea dysuria achiness weight loss or other complaint.  A complete view of systems was obtained and is otherwise negative  Past Medical History:   Diagnosis Date   • AF (atrial fibrillation) (CMS/[facility])     PT TO HAVE CARDIAC ABLATION AFTER THIS EYE SURGERY 8/2018, BY DR CORTEZ   • AICD (automatic cardioverter/defibrillator) present    • Alcohol abuse    • Arthritis    • Borderline diabetes    • Bradycardia     AICD   • Carotid stenosis    • CHF (congestive heart failure) (CMS/[facility])    • CKD (chronic kidney disease)    • Coronary artery disease    • Depression    • GERD (gastroesophageal reflux disease)    • Hearing loss     HEARING AID BILAT   • Hx of CABG    • Hyperlipidemia    • Hypertension    • Ischemic cardiomyopathy    • LBBB (left bundle branch block)     PER [place] HX   • MI (myocardial infarction) (CMS/[facility])     SEVERAL   • MRSA carrier     [facility], [facility]FECTION CONTROL CALLED/MARTHA   • On anticoagulant therapy    • Paresthesia     PT WITH OCCASIONAL FINGERTIP TINGLING, CONSTANT NUMBNESS BILAT FEET - PT STATES WORKUP IN PROGRESS FOR VASCULAR BYPASS   • Prosthetic eye globe     LEFT   • Pulmonary nodule     biopsied 2018?   • Sleep apnea with use of continuous positive airway pressure (CPAP)    • Stroke (CMS/[facility])     HAD BRAIN BLEED AFTER HIGH DOSES WARFARIN   • Valvular heart disease    • Vascular disease     LOWER EXTREMITIES       Allergies   Allergen Reactions   • Azithromycin Dermatitis   • Clopidogrel Anaphylaxis   • Codeine Dermatitis   • Cephalexin Dermatitis     Pt unsure of this  allergy       Past Surgical History:   Procedure Laterality Date   • CARDIAC DEFIBRILLATOR PLACEMENT  2018    DEAM PLACED FIRST ONE THAT BECAME INFECTED; SECOND PLACED BY DR LAZRAO   • CAROTID ENDARTERECTOMY Left    • CORONARY ARTERY BYPASS GRAFT      ABE   • EYE ENUCLEATION Left 2018    Procedure: LEFT REMOVAL OF ORBITAL IMPLANT LEFT SECONDARY VOLUME IMPLANT, DERMIS FAT GRAFT, DISLA SUTURE;  Surgeon: Moe Arguello MD;  [facility] OR [facility];  Service: Ophthalmology   • INTRAOCULAR PROSTHESES INSERTION      5 EYE SURGERIES, DR MERIDA UJULIETH VALENZUELA       Family History   Problem Relation Age of Onset   • Mental illness Neg Hx        Social History     Socioeconomic History   • Marital status:      Spouse name: Not on file   • Number of children: Not on file   • Years of education: Not on file   • Highest education level: Not on file   Tobacco Use   • Smoking status: Former Smoker     Packs/day: 3.00     Years: 40.00     Pack years: 120.00     Types: Cigarettes     Quit date:      Years since quittin.0   • Smokeless tobacco: Never Used   Substance and Sexual Activity   • Alcohol use: Yes     Alcohol/week: 56.0 standard drinks     Types: 56 Cans of beer per week     Comment: 8 BEERS A DAY   • Drug use: No   • Sexual activity: Defer           Objective   Physical Exam  HEENT exam shows TMs to be clear.  Oropharynx comers but sclerae nonicteric.  Neck has no adenopathy JVD or bruits.  Lungs have rhonchi at the bases.  Heart has regular rate and rhythm without murmur rub or gallop her chest is nontender.  Abdomen is soft nontender.  Extremity exam is no cyanosis or edema.  Procedures           ED Course      Results for orders placed or performed during the hospital encounter of 21   Basic Metabolic Panel    Specimen: Blood   Result Value Ref Range    Glucose 189 (H) 65 - 99 mg/dL    BUN 36 (H) 8 - 23 mg/dL    Creatinine 1.79 (H) 0.76 - 1.27 mg/dL    Sodium 137 136 - 145 mmol/L     Potassium 3.7 3.5 - 5.2 mmol/L    Chloride 93 (L) 98 - 107 mmol/L    CO2 33.0 (H) 22.0 - 29.0 mmol/L    Calcium 9.6 8.6 - 10.5 mg/dL    eGFR Non African Amer 37 (L) >60 mL/min/1.73    BUN/Creatinine Ratio 20.1 7.0 - 25.0    Anion Gap 11.0 5.0 - 15.0 mmol/L   CBC Auto Differential    Specimen: Blood   Result Value Ref Range    WBC 6.90 3.40 - 10.80 10*3/mm3    RBC 4.22 4.14 - 5.80 10*6/mm3    Hemoglobin 12.1 (L) 13.0 - 17.7 g/dL    Hematocrit 37.3 (L) 37.5 - 51.0 %    MCV 88.4 79.0 - 97.0 fL    MCH 28.7 26.6 - 33.0 pg    MCHC 32.5 31.5 - 35.7 g/dL    RDW 17.3 (H) 12.3 - 15.4 %    RDW-SD 52.9 37.0 - 54.0 fl    MPV 9.8 6.0 - 12.0 fL    Platelets 128 (L) 140 - 450 10*3/mm3    Neutrophil % 94.1 (H) 42.7 - 76.0 %    Lymphocyte % 1.7 (L) 19.6 - 45.3 %    Monocyte % 3.8 (L) 5.0 - 12.0 %    Eosinophil % 0.0 (L) 0.3 - 6.2 %    Basophil % 0.4 0.0 - 1.5 %    Neutrophils, Absolute 6.50 1.70 - 7.00 10*3/mm3    Lymphocytes, Absolute 0.10 (L) 0.70 - 3.10 10*3/mm3    Monocytes, Absolute 0.30 0.10 - 0.90 10*3/mm3    Eosinophils, Absolute 0.00 0.00 - 0.40 10*3/mm3    Basophils, Absolute 0.00 0.00 - 0.20 10*3/mm3    nRBC 0.0 0.0 - 0.2 /100 WBC   Blood Gas, Arterial -    Specimen: Arterial Blood   Result Value Ref Range    Site Right Radial     Skyler's Test Positive     pH, Arterial 7.549 (H) 7.350 - 7.450 pH units    pCO2, Arterial 35.0 35.0 - 48.0 mm Hg    pO2, Arterial 68.5 (L) 83.0 - 108.0 mm Hg    HCO3, Arterial 30.6 (H) 21.0 - 28.0 mmol/L    Base Excess, Arterial 8.0 (H) 0.0 - 3.0 mmol/L    O2 Saturation, Arterial 95.6 94.0 - 98.0 %    CO2 Content 31.7 (H) 22 - 29 mmol/L    Barometric Pressure for Blood Gas      Modality Cannula     FIO2 32 %    Hemodilution No    Light Blue Top   Result Value Ref Range    Extra Tube hold for add-on    Green Top (Gel)   Result Value Ref Range    Extra Tube Hold for add-ons.    Gold Top - SST   Result Value Ref Range    Extra Tube Hold for add-ons.      Xr Chest 1 View    Result Date: 1/14/2021  1.  There are bilateral airspace opacities which are consistent with pneumonia and/or pulmonary edema. 2. Cardiomegaly.  Electronically Signed By-Kristi Menon MD On:1/14/2021 7:01 PM This report was finalized on [id number] by  Kristi Menon MD.                                         MDM  Number of Diagnoses or Management Options  Diagnosis management comments: I reviewed the patient's laboratory data and x-rays.  Patient is noted to have right basilar infiltrate on his chest x-ray.  He is 7 days into a Covid illness.  Patient was also positive for sepsis and sepsis orders were initiated.  Patient received antibiotics and had blood cultures obtained.  Patient will be admitted to the on-call hospitalist.  I did speak to the hospitalist.       Amount and/or Complexity of Data Reviewed  Clinical lab tests: reviewed  Tests in the radiology section of CPT®: reviewed    Risk of Complications, Morbidity, and/or Mortality  Presenting problems: high  Diagnostic procedures: high  Management options: high    Critical Care  Total time providing critical care: 30-74 minutes      Final diagnoses:   Sepsis, due to unspecified organism, unspecified whether acute organ dysfunction present (CMS/[facility])   Pneumonia due to COVID-19 virus            Heber Garduno MD  01/14/21 1953

## 2021-01-15 NOTE — PLAN OF CARE
Goal Outcome Evaluation:  Plan of Care Reviewed With: patient  Progress: no change  Outcome Summary: Pt. admitted  from home for fevere & cough. COVID + past week

## 2021-01-15 NOTE — ED NOTES
Plan of care discussed, pt reports that he wants to go home later today. Pt denies any complaints at this time. Call light within reach. Will continue to monitor.      Sameer Skaggs RN  01/15/21 0608

## 2021-01-15 NOTE — PROGRESS NOTES
Discharge Planning Assessment  [facility]     Patient Name: Moe Lora  MRN: [id number]  Today's Date: 1/15/2021    Admit Date: 1/14/2021      Discharge Plan     Row Name 01/15/21 0752       Plan    Plan Comments  Patient is a boarder in ER and currently covid +.  Attempted to contact via telephone for case managing.  Patient unavailable at this time.  Will attempt at later time.  Will need case managed.        Phone communication only - no physical contact with patient or family.      Maria Teresa Lewis RN    /[place] [address]    [phone] office  [phone] fax  [email or web address]    Hospital NPI:   Hospital Tax ID: [id number]

## 2021-01-15 NOTE — H&P
"      [facility] Medicine Services      Patient Name: Moe Lora  : 1945  MRN: [id number]  Primary Care Physician: Judit, No Known  Date of admission: 2021    Patient Care Team:  Provider, No Known as PCP - General          Subjective   History Present Illness     Chief Complaint:   Chief Complaint   Patient presents with   • Exposure To Known Illness     History of Present Illness  75-year-old  male with history of countless medical problems as outlined below, presented to the ED complaining of increasing shortness of breath over the past several days.  States shortness of breath is much worse on exertion.  Patient was diagnosed with Covid symptomatic over the past 7 to 8 days.  He also has productive cough with \"nasty phlegm \", fever, sweating, and nausea without vomiting.  He denies other complaint.    Emergency department course:  Febrile with initial temperature 103.1, hemodynamically stable, no acute distress.  Physical examination per ED physician was significant for presence of rhonchi at the bases.  ABG analysis on FiO2 32% showed a pH of 7.549, PCO2 35, PO2 68.5 and O2 saturation 95.6.  Labs were significant for blood glucose level 189, creatinine 1.79, BUN 36, and GFR 37.  WBC count 6.9 with neutrophils 94.1% and lymphocyte 1.7%.  Lactate level was normal at 1.9.  Chest x-ray reported \". There are bilateral airspace opacities which are consistent with pneumonia and/or pulmonary edema. 2. Cardiomegaly\".  Patient was given 1000 mg Tylenol, 800 mg ibuprofen, 750 mg IV levofloxacin and placed on supplemental O2 with 2 L nasal cannula.    ROS  Please refer to HPI. All other systems were reviewed and were negative.     Personal History     Past Medical History:   Past Medical History:   Diagnosis Date   • AF (atrial fibrillation) (CMS/[facility])     PT TO HAVE CARDIAC ABLATION AFTER THIS EYE SURGERY 2018, BY DR CORTEZ   • AICD (automatic cardioverter/defibrillator) " present    • Alcohol abuse    • Arthritis    • Borderline diabetes    • Bradycardia     AICD   • Carotid stenosis    • CHF (congestive heart failure) (CMS/HCC)    • CKD (chronic kidney disease)    • Coronary artery disease    • Depression    • Diabetes (CMS/HCC)    • GERD (gastroesophageal reflux disease)    • Hearing loss     HEARING AID BILAT   • Hx of CABG    • Hyperlipidemia    • Hypertension    • Ischemic cardiomyopathy    • LBBB (left bundle branch block)     PER [facility] HX   • MI (myocardial infarction) (CMS/HCC)     SEVERAL   • MRSA carrier     PT [facility]ITIVE [facility], INFECTION CONTROL CALLED/MARTHA   • On anticoagulant therapy    • Paresthesia     PT WITH OCCASIONAL FINGERTIP TINGLING, CONSTANT NUMBNESS BILAT FEET - PT STATES WORKUP IN PROGRESS FOR VASCULAR BYPASS   • Prosthetic eye globe     LEFT   • Pulmonary nodule     biopsied 2018?   • Sleep apnea with use of continuous positive airway pressure (CPAP)    • Stroke (CMS/[facility])     HAD BRAIN BLEED AFTER HIGH DOSES WARFARIN   • Valvular heart disease    • Vascular disease     LOWER EXTREMITIES       Surgical History:      Past Surgical History:   Procedure Laterality Date   • CARDIAC DEFIBRILLATOR PLACEMENT  02/2018    DEAM PLACED FIRST ONE THAT BECAME INFECTED; SECOND PLACED BY DR LAZARO   • CAROTID ENDARTERECTOMY Left    • CORONARY ARTERY BYPASS GRAFT  2006 FLOYD   • EYE ENUCLEATION Left 8/2/2018    Procedure: LEFT REMOVAL OF ORBITAL IMPLANT LEFT SECONDARY VOLUME IMPLANT, DERMIS FAT GRAFT, DISLA SUTURE;  Surgeon: Moe Arguello MD;  [place] [facility] [place] [facility] [place]: [place]mology   • INTRAOCULAR PROSTHESES INSERTION      5 EYE SURGERIES, DR FUAD VALENZUELA           Family History: family history is not on file. Otherwise pertinent FHx was reviewed and unremarkable.     Social History:  reports that he quit smoking about 16 years ago. His smoking use included cigarettes. He has a 120.00 pack-year smoking history. He has never used  smokeless tobacco. He reports current alcohol use of about 56.0 standard drinks of alcohol per week. He reports that he does not use drugs.      Medications:  Prior to Admission medications    Medication Sig Start Date End Date Taking? Authorizing Provider   allopurinol (ZYLOPRIM) 300 MG tablet Take 300 mg by mouth Daily. 2/17/18   Carmelo Spain MD   aspirin 81 MG chewable tablet Chew 81 mg Daily. Stopped preop, last dose 7/18/18    Carmelo Spain MD   atorvastatin (LIPITOR) 40 MG tablet Take 40 mg by mouth Every Night. 2/16/18   Carmelo Spain MD   carvedilol (COREG) 25 MG tablet Take 37.5 mg by mouth Every 12 (Twelve) Hours. 2/20/18   Carmelo Spain MD   dabigatran etexilate (PRADAXA) 150 MG capsu Take 150 mg by mouth 2 (Two) Times a Day. Pt states was instructed to stop preop on 7/30/18 2/23/18   Carmelo Spain MD   diphenhydrAMINE-acetaminophen (TYLENOL PM)  MG tablet per tablet Take 1 tablet by mouth At Night As Needed for Sleep.    Carmelo Spain MD   erythromycin (ROMYCIN) 5 MG/GM ophthalmic ointment Gently apply to sutures twice a day 8/2/18   Moe Arguello MD   furosemide (LASIX) 80 MG tablet Take 80 mg by mouth 2 (Two) Times a Day. 2/16/18   Carmelo Spain MD   HYDROcodone-acetaminophen (NORCO) 7.5-325 MG per tablet Take 1 tablet by mouth Every 6 (Six) Hours As Needed for Moderate Pain . 8/2/18   Heber Church Jr., MD   isosorbide mononitrate (IMDUR) 60 MG 24 hr tablet Take 60 mg by mouth Daily.    Carmelo Spain MD   lisinopril (PRINIVIL,ZESTRIL) 40 MG tablet Take 40 mg by mouth Daily. 2/17/18   Carmelo Spain MD   omeprazole (priLOSEC) 20 MG capsule Take 20 mg by mouth Every Night.    Carmelo Spain MD       Allergies:    Allergies   Allergen Reactions   • Azithromycin Dermatitis   • Clopidogrel Anaphylaxis   • Codeine Dermatitis   • Cephalexin Dermatitis     Pt unsure of this allergy       Objective   Objective      Vital Signs  Temp:  [100.1 °F (37.8 °C)-103.1 °F (39.5 °C)] 100.1 °F (37.8 °C)  Heart Rate:  [70-79] 70  Resp:  [18-20] 18  BP: (110-119)/(65-68) 110/68  SpO2:  [97 %-100 %] 97 %  on  Flow (L/min):  [4] 4;   Device (Oxygen Therapy): nasal cannula  Body mass index is 42.19 kg/m².    Physical Exam  General: Morbidly obese, alert and oriented x3, no acute distress.   Eyes:  Show anicteric sclerae, moist conjunctivae with no lig lag; PERRLA.  HENT:  Normocephalic, prosthetic left eye, hard of hearing, moist oral mucosa.  Neck: Short and thick, no bruit, no JVP, no thyroid or lymph node enlargement, trachea central,   Lungs:  Good air entry.  Few bibasilar crackles.  No rhonchi or wheezing.  Heart: RRR, no murmur or rub.   Abdomen:  Soft, not tender, not distended, no organomegaly, bowel sounds positive.   Extremities: No leg edema or joint swelling, no calf tenderness, normal range of movement, pedal pulses intact.   Skin: No rash, lesions, or ulcers.  Normal texture and turgor.  Neurology:  Grossly intact.   Psychiatric exam: Pleasant, cooperative, appropriate mood and affect, intact judgment and insight.      Diagnostic data review:  I have personally reviewed most recent lab results, microbiology results and radiology images and interpretations and agree with findings, most notably: .    Results from last 7 days   Lab Units 01/14/21  1825   WBC 10*3/mm3 6.90   HEMOGLOBIN g/dL 12.1*   HEMATOCRIT % 37.3*   PLATELETS 10*3/mm3 128*     Results from last 7 days   Lab Units 01/14/21 2004 01/14/21  1825   SODIUM mmol/L  --  137   POTASSIUM mmol/L  --  3.7   CHLORIDE mmol/L  --  93*   CO2 mmol/L  --  33.0*   BUN mg/dL  --  36*   CREATININE mg/dL  --  1.79*   GLUCOSE mg/dL  --  189*   CALCIUM mg/dL  --  9.6   LACTATE mmol/L 1.9  --    PROCALCITONIN ng/mL  --  0.29*     Estimated Creatinine Clearance: 29.5 mL/min (A) (by C-G formula based on SCr of 1.79 mg/dL (H)).  Brief Urine Lab Results     None          Microbiology  Results (last 10 days)     ** No results found for the last 240 hours. **          ECG/EMG Results (most recent)     None                    Xr Chest 1 View    Result Date: 1/14/2021  1. There are bilateral airspace opacities which are consistent with pneumonia and/or pulmonary edema. 2. Cardiomegaly.  Electronically Signed By-Kristi Menon MD On:1/14/2021 7:01 PM This report was finalized on [id number] by  Kristi Menon MD.        Estimated Creatinine Clearance: 29.5 mL/min (A) (by C-G formula based on SCr of 1.79 mg/dL (H)).    Assessment/Plan   Assessment/Plan       Active Hospital Problems    Diagnosis  POA   • Sepsis (CMS/HCC) [A41.9]  Yes   • Acute kidney injury superimposed on CKD (CMS/HCC) [N17.9, N18.9]  Yes      Resolved Hospital Problems   No resolved problems to display.     Assessment:  1.  COVID-19 infection with possible pneumonia.    2.  SAMIR on stage III CKD-likely 2/2 diuretic use.    3.  Uncontrolled type II DM.    4.  History of atrial fibrillation-s/p ablation-on anticoagulation with Pradaxa.    5.  Status post AICD placement.    6.  CAD/Hx of MI-s/p CABG.    7.  Hypertension.    8.  Hyperlipidemia.    9.  Obstructive sleep apnea.    10.  CHF-on diuretic.    11.  Morbid obesity.    Plan:  -Admission to Covid unit.  -Implement COVID-19 protocol with disease progression monitoring labs.  -Consult pharmacy for remdesivir therapy.  Start Decadron, vitamin C, zinc, and vitamin D.  -Continue empiric antibiotic coverage with Levaquin pending the blood and urine culture results.  -Check procalcitonin, CRP, liver enzymes, proBNP, and respiratory infection panel.  -Hold furosemide and lisinopril. Avoid nephrotoxic medications.  Monitor renal function.  If it worsens then will consult nephrology.  -Continue patient's other home medications.  Accu-Chek with basal and correctional insulins.    VTE Prophylaxis -   Mechanical Order History:     None      Pharmalogical Order History:      Ordered     Dose  Route Frequency Stop    Signed and Held  dabigatran etexilate (PRADAXA) capsule 150 mg      150 mg PO 2 Times Daily --                CODE STATUS:    Code Status and Medical Interventions:   Ordered at: 01/14/21 2125     Code Status:    CPR     Medical Interventions (Level of Support Prior to Arrest):    Full       This patient has been examined wearing appropriate Personal Protective Equipment and discussed with hospital infection control department. 01/14/21      I discussed the patient's findings and my recommendations with patient.      Signature:Electronically signed by Jordi Padgett MD, 01/15/21, 1:03 AM EST.      [facility]ist Team

## 2021-01-15 NOTE — ED NOTES
Pt placed on inpatient bed, pt denies any complaints at this time. Fady light within reach.      Sameer Skaggs, RN  01/15/21 5691

## 2021-01-16 LAB
ALBUMIN SERPL-MCNC: 3.5 G/DL (ref 3.5–5.2)
ALBUMIN/GLOB SERPL: 1.2 G/DL
ALP SERPL-CCNC: 289 U/L (ref 39–117)
ALT SERPL W P-5'-P-CCNC: 19 U/L (ref 1–41)
ANION GAP SERPL CALCULATED.3IONS-SCNC: 15 MMOL/L (ref 5–15)
AST SERPL-CCNC: 34 U/L (ref 1–40)
BASOPHILS # BLD AUTO: 0 10*3/MM3 (ref 0–0.2)
BASOPHILS NFR BLD AUTO: 0.1 % (ref 0–1.5)
BILIRUB SERPL-MCNC: 1.1 MG/DL (ref 0–1.2)
BILIRUB UR QL STRIP: NEGATIVE
BUN SERPL-MCNC: 51 MG/DL (ref 8–23)
BUN/CREAT SERPL: 25.9 (ref 7–25)
C DIFF GDH STL QL: NEGATIVE
CA-I SERPL ISE-MCNC: 1.12 MMOL/L (ref 1.2–1.3)
CALCIUM SPEC-SCNC: 9.2 MG/DL (ref 8.6–10.5)
CHLORIDE SERPL-SCNC: 95 MMOL/L (ref 98–107)
CK SERPL-CCNC: 136 U/L (ref 20–200)
CLARITY UR: CLEAR
CO2 SERPL-SCNC: 26 MMOL/L (ref 22–29)
COLOR UR: YELLOW
CREAT SERPL-MCNC: 1.97 MG/DL (ref 0.76–1.27)
CRP SERPL-MCNC: 12.1 MG/DL (ref 0–0.5)
D DIMER PPP FEU-MCNC: 0.94 MG/L (FEU) (ref 0–0.59)
DEPRECATED RDW RBC AUTO: 51.2 FL (ref 37–54)
EOSINOPHIL # BLD AUTO: 0 10*3/MM3 (ref 0–0.4)
EOSINOPHIL NFR BLD AUTO: 0 % (ref 0.3–6.2)
EOSINOPHIL SPEC QL MICRO: 0 % EOS/100 CELLS (ref 0–0)
ERYTHROCYTE [DISTWIDTH] IN BLOOD BY AUTOMATED COUNT: 16.9 % (ref 12.3–15.4)
FERRITIN SERPL-MCNC: 3595 NG/ML (ref 30–400)
FIBRINOGEN PPP-MCNC: 562 MG/DL (ref 210–450)
GFR SERPL CREATININE-BSD FRML MDRD: 33 ML/MIN/1.73
GLOBULIN UR ELPH-MCNC: 3 GM/DL
GLUCOSE BLDC GLUCOMTR-MCNC: 144 MG/DL (ref 70–105)
GLUCOSE BLDC GLUCOMTR-MCNC: 155 MG/DL (ref 70–105)
GLUCOSE BLDC GLUCOMTR-MCNC: 183 MG/DL (ref 70–105)
GLUCOSE BLDC GLUCOMTR-MCNC: 253 MG/DL (ref 70–105)
GLUCOSE SERPL-MCNC: 149 MG/DL (ref 65–99)
GLUCOSE UR STRIP-MCNC: NEGATIVE MG/DL
HCT VFR BLD AUTO: 38.4 % (ref 37.5–51)
HGB BLD-MCNC: 12.4 G/DL (ref 13–17.7)
HGB UR QL STRIP.AUTO: NEGATIVE
KETONES UR QL STRIP: NEGATIVE
L PNEUMO1 AG UR QL IA: NEGATIVE
LDH SERPL-CCNC: 558 U/L (ref 135–225)
LEUKOCYTE ESTERASE UR QL STRIP.AUTO: NEGATIVE
LYMPHOCYTES # BLD AUTO: 0.2 10*3/MM3 (ref 0.7–3.1)
LYMPHOCYTES NFR BLD AUTO: 2.4 % (ref 19.6–45.3)
MAGNESIUM SERPL-MCNC: 2.1 MG/DL (ref 1.6–2.4)
MCH RBC QN AUTO: 28.5 PG (ref 26.6–33)
MCHC RBC AUTO-ENTMCNC: 32.3 G/DL (ref 31.5–35.7)
MCV RBC AUTO: 88.1 FL (ref 79–97)
MONOCYTES # BLD AUTO: 0.1 10*3/MM3 (ref 0.1–0.9)
MONOCYTES NFR BLD AUTO: 2 % (ref 5–12)
NEUTROPHILS NFR BLD AUTO: 6.4 10*3/MM3 (ref 1.7–7)
NEUTROPHILS NFR BLD AUTO: 95.5 % (ref 42.7–76)
NITRITE UR QL STRIP: NEGATIVE
NRBC BLD AUTO-RTO: 0.1 /100 WBC (ref 0–0.2)
PH UR STRIP.AUTO: 5.5 [PH] (ref 5–8)
PHOSPHATE SERPL-MCNC: 4.3 MG/DL (ref 2.5–4.5)
PLATELET # BLD AUTO: 119 10*3/MM3 (ref 140–450)
PMV BLD AUTO: 10.3 FL (ref 6–12)
POTASSIUM SERPL-SCNC: 3.1 MMOL/L (ref 3.5–5.2)
PROT SERPL-MCNC: 6.5 G/DL (ref 6–8.5)
PROT UR QL STRIP: ABNORMAL
RBC # BLD AUTO: 4.36 10*6/MM3 (ref 4.14–5.8)
S PNEUM AG SPEC QL LA: NEGATIVE
SODIUM SERPL-SCNC: 136 MMOL/L (ref 136–145)
SODIUM UR-SCNC: 25 MMOL/L
SP GR UR STRIP: 1.01 (ref 1–1.03)
UROBILINOGEN UR QL STRIP: ABNORMAL
WBC # BLD AUTO: 6.7 10*3/MM3 (ref 3.4–10.8)

## 2021-01-16 PROCEDURE — 82330 ASSAY OF CALCIUM: CPT | Performed by: INTERNAL MEDICINE

## 2021-01-16 PROCEDURE — 25010000002 LEVOFLOXACIN PER 250 MG: Performed by: INTERNAL MEDICINE

## 2021-01-16 PROCEDURE — 87449 NOS EACH ORGANISM AG IA: CPT | Performed by: NURSE PRACTITIONER

## 2021-01-16 PROCEDURE — 99232 SBSQ HOSP IP/OBS MODERATE 35: CPT | Performed by: INTERNAL MEDICINE

## 2021-01-16 PROCEDURE — 87324 CLOSTRIDIUM AG IA: CPT | Performed by: NURSE PRACTITIONER

## 2021-01-16 PROCEDURE — 85379 FIBRIN DEGRADATION QUANT: CPT | Performed by: INTERNAL MEDICINE

## 2021-01-16 PROCEDURE — 86140 C-REACTIVE PROTEIN: CPT | Performed by: INTERNAL MEDICINE

## 2021-01-16 PROCEDURE — 25010000002 CALCIUM GLUCONATE-NACL 1-0.675 GM/50ML-% SOLUTION: Performed by: INTERNAL MEDICINE

## 2021-01-16 PROCEDURE — 83615 LACTATE (LD) (LDH) ENZYME: CPT | Performed by: INTERNAL MEDICINE

## 2021-01-16 PROCEDURE — 85025 COMPLETE CBC W/AUTO DIFF WBC: CPT | Performed by: INTERNAL MEDICINE

## 2021-01-16 PROCEDURE — 84300 ASSAY OF URINE SODIUM: CPT | Performed by: INTERNAL MEDICINE

## 2021-01-16 PROCEDURE — 83735 ASSAY OF MAGNESIUM: CPT | Performed by: INTERNAL MEDICINE

## 2021-01-16 PROCEDURE — 82728 ASSAY OF FERRITIN: CPT | Performed by: INTERNAL MEDICINE

## 2021-01-16 PROCEDURE — G0378 HOSPITAL OBSERVATION PER HR: HCPCS

## 2021-01-16 PROCEDURE — 82962 GLUCOSE BLOOD TEST: CPT

## 2021-01-16 PROCEDURE — 87899 AGENT NOS ASSAY W/OPTIC: CPT | Performed by: INTERNAL MEDICINE

## 2021-01-16 PROCEDURE — 85384 FIBRINOGEN ACTIVITY: CPT | Performed by: INTERNAL MEDICINE

## 2021-01-16 PROCEDURE — 25010000002 HEPARIN (PORCINE) PER 1000 UNITS: Performed by: INTERNAL MEDICINE

## 2021-01-16 PROCEDURE — 82550 ASSAY OF CK (CPK): CPT | Performed by: INTERNAL MEDICINE

## 2021-01-16 PROCEDURE — 87205 SMEAR GRAM STAIN: CPT | Performed by: INTERNAL MEDICINE

## 2021-01-16 PROCEDURE — 80053 COMPREHEN METABOLIC PANEL: CPT | Performed by: INTERNAL MEDICINE

## 2021-01-16 PROCEDURE — 84100 ASSAY OF PHOSPHORUS: CPT | Performed by: INTERNAL MEDICINE

## 2021-01-16 PROCEDURE — 81003 URINALYSIS AUTO W/O SCOPE: CPT | Performed by: INTERNAL MEDICINE

## 2021-01-16 RX ORDER — CALCIUM GLUCONATE 20 MG/ML
1 INJECTION, SOLUTION INTRAVENOUS ONCE
Status: COMPLETED | OUTPATIENT
Start: 2021-01-16 | End: 2021-01-16

## 2021-01-16 RX ORDER — LOPERAMIDE HYDROCHLORIDE 2 MG/1
2 CAPSULE ORAL 4 TIMES DAILY PRN
Status: DISCONTINUED | OUTPATIENT
Start: 2021-01-16 | End: 2021-01-19 | Stop reason: HOSPADM

## 2021-01-16 RX ORDER — POTASSIUM CHLORIDE 1.5 G/1.77G
40 POWDER, FOR SOLUTION ORAL AS NEEDED
Status: DISCONTINUED | OUTPATIENT
Start: 2021-01-16 | End: 2021-01-19 | Stop reason: HOSPADM

## 2021-01-16 RX ORDER — HEPARIN SODIUM 5000 [USP'U]/ML
7500 INJECTION, SOLUTION INTRAVENOUS; SUBCUTANEOUS EVERY 12 HOURS SCHEDULED
Status: DISCONTINUED | OUTPATIENT
Start: 2021-01-16 | End: 2021-01-19 | Stop reason: HOSPADM

## 2021-01-16 RX ORDER — POTASSIUM CHLORIDE 20 MEQ/1
40 TABLET, EXTENDED RELEASE ORAL AS NEEDED
Status: DISCONTINUED | OUTPATIENT
Start: 2021-01-16 | End: 2021-01-19 | Stop reason: HOSPADM

## 2021-01-16 RX ORDER — CARVEDILOL 6.25 MG/1
12.5 TABLET ORAL
Status: DISCONTINUED | OUTPATIENT
Start: 2021-01-16 | End: 2021-01-19 | Stop reason: HOSPADM

## 2021-01-16 RX ORDER — IVERMECTIN 3 MG/1
15 TABLET ORAL ONCE
Status: COMPLETED | OUTPATIENT
Start: 2021-01-17 | End: 2021-01-17

## 2021-01-16 RX ADMIN — LORAZEPAM 0.5 MG: 0.5 TABLET ORAL at 21:51

## 2021-01-16 RX ADMIN — DOXYCYCLINE 100 MG: 100 TABLET, FILM COATED ORAL at 09:12

## 2021-01-16 RX ADMIN — CALCIUM GLUCONATE 1 G: 20 INJECTION, SOLUTION INTRAVENOUS at 09:13

## 2021-01-16 RX ADMIN — OXYCODONE HYDROCHLORIDE AND ACETAMINOPHEN 500 MG: 500 TABLET ORAL at 21:51

## 2021-01-16 RX ADMIN — SODIUM CHLORIDE 100 MG: 9 INJECTION, SOLUTION INTRAVENOUS at 17:29

## 2021-01-16 RX ADMIN — LEVOFLOXACIN 750 MG: 5 INJECTION, SOLUTION INTRAVENOUS at 21:55

## 2021-01-16 RX ADMIN — DOXYCYCLINE 100 MG: 100 TABLET, FILM COATED ORAL at 21:50

## 2021-01-16 RX ADMIN — POTASSIUM CHLORIDE 40 MEQ: 1500 TABLET, EXTENDED RELEASE ORAL at 21:50

## 2021-01-16 RX ADMIN — Medication 10 ML: at 21:52

## 2021-01-16 RX ADMIN — ATORVASTATIN CALCIUM 40 MG: 40 TABLET, FILM COATED ORAL at 21:50

## 2021-01-16 RX ADMIN — CARVEDILOL 12.5 MG: 6.25 TABLET, FILM COATED ORAL at 09:13

## 2021-01-16 RX ADMIN — OXYCODONE HYDROCHLORIDE AND ACETAMINOPHEN 500 MG: 500 TABLET ORAL at 11:30

## 2021-01-16 RX ADMIN — CARVEDILOL 12.5 MG: 6.25 TABLET, FILM COATED ORAL at 17:27

## 2021-01-16 RX ADMIN — CYANOCOBALAMIN TAB 1000 MCG 1000 MCG: 1000 TAB at 09:13

## 2021-01-16 RX ADMIN — LOPERAMIDE HYDROCHLORIDE 2 MG: 2 CAPSULE ORAL at 21:50

## 2021-01-16 RX ADMIN — ISOSORBIDE MONONITRATE 30 MG: 30 TABLET, EXTENDED RELEASE ORAL at 09:12

## 2021-01-16 RX ADMIN — HEPARIN SODIUM 7500 UNITS: 5000 INJECTION INTRAVENOUS; SUBCUTANEOUS at 21:49

## 2021-01-16 RX ADMIN — ZINC SULFATE 220 MG (50 MG) CAPSULE 440 MG: CAPSULE at 09:12

## 2021-01-16 RX ADMIN — OXYCODONE HYDROCHLORIDE AND ACETAMINOPHEN 500 MG: 500 TABLET ORAL at 17:27

## 2021-01-16 RX ADMIN — ASPIRIN 81 MG: 81 TABLET, CHEWABLE ORAL at 09:13

## 2021-01-16 RX ADMIN — OXYCODONE HYDROCHLORIDE AND ACETAMINOPHEN 500 MG: 500 TABLET ORAL at 05:50

## 2021-01-16 RX ADMIN — Medication 10 ML: at 09:13

## 2021-01-16 RX ADMIN — SODIUM CHLORIDE 60 ML/HR: 9 INJECTION, SOLUTION INTRAVENOUS at 21:52

## 2021-01-16 RX ADMIN — Medication 100 MG: at 09:13

## 2021-01-16 RX ADMIN — LORAZEPAM 0.5 MG: 0.5 TABLET ORAL at 16:28

## 2021-01-16 RX ADMIN — Medication 1000 UNITS: at 09:12

## 2021-01-16 RX ADMIN — ALLOPURINOL 300 MG: 300 TABLET ORAL at 09:14

## 2021-01-16 RX ADMIN — Medication 100 MG: at 09:12

## 2021-01-16 RX ADMIN — DEXAMETHASONE 6 MG: 4 TABLET ORAL at 09:12

## 2021-01-16 RX ADMIN — SODIUM CHLORIDE 60 ML/HR: 9 INJECTION, SOLUTION INTRAVENOUS at 05:50

## 2021-01-16 RX ADMIN — FAMOTIDINE 20 MG: 20 TABLET, FILM COATED ORAL at 09:12

## 2021-01-16 NOTE — PLAN OF CARE
Problem: Breathing Pattern Ineffective  Goal: Effective Breathing Pattern  Intervention: Promote Improved Breathing Pattern  Recent Flowsheet Documentation  Taken 1/15/2021 2300 by Олег Barraza LPN  Head of Bed (HOB): HOB at 20-30 degrees  Taken 1/15/2021 2100 by Олег Barraza LPN  Head of Bed (HOB): HOB at 20-30 degrees  Taken 1/15/2021 1955 by Олег Barraza LPN  Head of Bed (HOB): HOB at 20-30 degrees     Problem: Adult Inpatient Plan of Care  Goal: Absence of Hospital-Acquired Illness or Injury  Intervention: Identify and Manage Fall Risk  Recent Flowsheet Documentation  Taken 1/15/2021 2300 by Олег Barraza LPN  Safety Promotion/Fall Prevention:   assistive device/personal items within reach   clutter free environment maintained   elopement precautions   fall prevention program maintained   safety round/check completed   room organization consistent   muscle strengthening facilitated   nonskid shoes/slippers when out of bed   mobility aid in reach  Taken 1/15/2021 2100 by Олег Barraza LPN  Safety Promotion/Fall Prevention:   assistive device/personal items within reach   fall prevention program maintained   lighting adjusted   mobility aid in reach   muscle strengthening facilitated   elopement precautions   clutter free environment maintained   nonskid shoes/slippers when out of bed  Taken 1/15/2021 1955 by Олег Barraza LPN  Safety Promotion/Fall Prevention:   assistive device/personal items within reach   clutter free environment maintained   lighting adjusted   mobility aid in reach   muscle strengthening facilitated   nonskid shoes/slippers when out of bed   room organization consistent   safety round/check completed  Intervention: Prevent Skin Injury  Recent Flowsheet Documentation  Taken 1/15/2021 2300 by Олег Barraza LPN  Body Position:   position changed independently   position maintained   patient/family refused  Taken 1/15/2021 2100 by Christi  Олег VALENZUELA LPN  Body Position: position changed independently  Taken 1/15/2021 1955 by Олег Barraza LPN  Body Position: position changed independently  Intervention: Prevent Infection  Recent Flowsheet Documentation  Taken 1/15/2021 2300 by Олег Barraza LPN  Infection Prevention:   equipment surfaces disinfected   hand hygiene promoted   personal protective equipment utilized   environmental surveillance performed   rest/sleep promoted   single patient room provided   visitors restricted/screened  Taken 1/15/2021 2100 by Олег Barraza LPN  Infection Prevention:   cohorting utilized   equipment surfaces disinfected   hand hygiene promoted   personal protective equipment utilized   single patient room provided   visitors restricted/screened  Taken 1/15/2021 1955 by Олег Barraza LPN  Infection Prevention:   personal protective equipment utilized   hand hygiene promoted   equipment surfaces disinfected   rest/sleep promoted   single patient room provided   Goal Outcome Evaluation:  Plan of Care Reviewed With: patient  Progress: no change

## 2021-01-16 NOTE — PROGRESS NOTES
"NEPHROLOGY PROGRESS NOTE------[facility]    [facility]  [phone]  Anastacia Gallegos MD      Patient Care Team:  Provider, No Known as PCP - Felicitas Poole MD as Consulting Physician (Nephrology)      Provider:  Anastacia Gallegos MD  Patient Name: Moe Lora  :  1945    SUBJECTIVE:    F/U ARF/SAMIR/CRF/CKD STG 3    Breathing and feeling much better. No angina.    Medication:  allopurinol, 300 mg, Oral, Daily  ascorbic acid, 500 mg, Oral, Q6H  aspirin, 81 mg, Oral, Daily  atorvastatin, 40 mg, Oral, Nightly  carvedilol, 37.5 mg, Oral, Q12H  cholecalciferol, 1,000 Units, Oral, Daily  dexamethasone, 6 mg, Oral, Daily With Breakfast  doxycycline, 100 mg, Oral, Q12H  famotidine, 20 mg, Oral, Daily  insulin glargine, 10 Units, Subcutaneous, Nightly  insulin lispro, 0-9 Units, Subcutaneous, TID AC  isosorbide mononitrate, 30 mg, Oral, Daily  levoFLOXacin, 750 mg, Intravenous, Q48H  LORazepam, 0.5 mg, Oral, Q6H    Followed by  LORazepam, 0.5 mg, Oral, Q8H  remdesivir, 100 mg, Intravenous, Q24H  sodium chloride, 10 mL, Intravenous, Q12H  thiamine, 100 mg, Oral, Daily  vitamin B-12, 1,000 mcg, Oral, Daily  vitamin B-6, 100 mg, Oral, Daily  zinc sulfate, 440 mg, Oral, Daily      Pharmacy Consult - Remdesivir,   Pharmacy Consult - Steroid Insulin Protocol,   sodium chloride, 60 mL/hr, Last Rate: 60 mL/hr (21 0550)        OBJECTIVE    Vital Sign Min/Max for last 24 hours  Temp  Min: 97.8 °F (36.6 °C)  Max: 99.8 °F (37.7 °C)   BP  Min: 88/50  Max: 121/75   Pulse  Min: 82  Max: 103   Resp  Min: 15  Max: 19   SpO2  Min: 96 %  Max: 100 %   No data recorded   Weight  Min: 83.2 kg (183 lb 6.8 oz)  Max: 83.2 kg (183 lb 6.8 oz)     Flowsheet Rows      First Filed Value   Admission Height  137.2 cm (54\") Documented at 2021   Admission Weight  79.4 kg (175 lb) Documented at 2021          No intake/output data recorded.  I/O " last 3 completed shifts:  In: 450 [P.O.:300; IV Piggyback:150]  Out: 250 [Urine:250]    Physical Exam:  General Appearance: alert, appears stated age and cooperative  Head: normocephalic, without obvious abnormality and atraumatic  Eyes: conjunctivae and sclerae normal and no icterus  Neck: supple and no JVD  Lungs: clear to auscultation and respirations regular  Heart: regular rhythm & normal rate and normal S1, S2 +PARUL  Chest: Wall no abnormalities observed  Abdomen: normal bowel sounds and soft non-tender  Extremities: moves extremities well, no edema, no cyanosis and no redness  Skin: no bleeding, bruising or rash, turgor normal, color normal and no lesions noted  Neurologic: Alert, and oriented. No focal deficits    Labs:    WBC WBC   Date Value Ref Range Status   01/16/2021 6.70 3.40 - 10.80 10*3/mm3 Final   01/14/2021 6.90 3.40 - 10.80 10*3/mm3 Final      HGB Hemoglobin   Date Value Ref Range Status   01/16/2021 12.4 (L) 13.0 - 17.7 g/dL Final   01/14/2021 12.1 (L) 13.0 - 17.7 g/dL Final      HCT Hematocrit   Date Value Ref Range Status   01/16/2021 38.4 37.5 - 51.0 % Final   01/14/2021 37.3 (L) 37.5 - 51.0 % Final      Platlets No results found for: LABPLAT   MCV MCV   Date Value Ref Range Status   01/16/2021 88.1 79.0 - 97.0 fL Final   01/14/2021 88.4 79.0 - 97.0 fL Final          Sodium Sodium   Date Value Ref Range Status   01/16/2021 136 136 - 145 mmol/L Final   01/14/2021 137 136 - 145 mmol/L Final      Potassium Potassium   Date Value Ref Range Status   01/16/2021 3.1 (L) 3.5 - 5.2 mmol/L Final   01/14/2021 3.7 3.5 - 5.2 mmol/L Final      Chloride Chloride   Date Value Ref Range Status   01/16/2021 95 (L) 98 - 107 mmol/L Final   01/14/2021 93 (L) 98 - 107 mmol/L Final      CO2 CO2   Date Value Ref Range Status   01/16/2021 26.0 22.0 - 29.0 mmol/L Final   01/14/2021 33.0 (H) 22.0 - 29.0 mmol/L Final      BUN BUN   Date Value Ref Range Status   01/16/2021 51 (H) 8 - 23 mg/dL Final   01/14/2021 36 (H) 8 -  23 mg/dL Final      Creatinine Creatinine   Date Value Ref Range Status   01/16/2021 1.97 (H) 0.76 - 1.27 mg/dL Final   01/14/2021 1.79 (H) 0.76 - 1.27 mg/dL Final      Calcium Calcium   Date Value Ref Range Status   01/16/2021 9.2 8.6 - 10.5 mg/dL Final   01/14/2021 9.6 8.6 - 10.5 mg/dL Final      PO4 No components found for: PO4   Albumin Albumin   Date Value Ref Range Status   01/16/2021 3.50 3.50 - 5.20 g/dL Final   01/15/2021 3.80 3.50 - 5.20 g/dL Final      Magnesium Magnesium   Date Value Ref Range Status   01/16/2021 2.1 1.6 - 2.4 mg/dL Final      Uric Acid No components found for: URIC ACID     Imaging Results (Last 72 Hours)     Procedure Component Value Units Date/Time    US Renal Bilateral [[id number]] Collected: 01/15/21 2207     Updated: 01/15/21 2220    Narrative:      Examination:      Date of Exam: 1/15/2021 6:06 PM     Indication: ARF/CKD; A41.9-Sepsis, unspecified organism; U07.1-COVID-19;  J12.82-Pneumonia due to Coronavirus disease 2019.     Comparison: None available.     Technique: Grayscale and color Doppler ultrasound evaluation of the  kidneys and urinary bladder was performed     Findings:  The right kidney is about 9.5 cm in length by 4 x 3.5 cm. There is a 9  mm anechoic lesion in the lower pole consistent with a cyst. There is  moderate cortical thinning. Parenchymal echogenicity is similar to the  liver. There is color flow in the right kidney. Negative for  hydronephrosis.     The left kidney is about 11.5 cm in length by 5.5 x 5 cm. There is an 11  mm anechoic lesion in the left kidney. There is mild cortical  thickening. There is color flow in the left kidney. Negative for  hydronephrosis.     Limited exam of the bladder shows volume of 20 cc.       Impression:      1. Negative for hydronephrosis.  2. There is moderate right renal cortical thinning. There is mild left  renal cortical thinning.  2. Small incidental renal cysts.     Electronically Signed By-Kristi Menon MD  On:1/15/2021 10:18 PM  This report was finalized on [id number] by  Kristi Menon MD.    XR Chest 1 View [[id number]] Collected: 01/14/21 1900     Updated: 01/14/21 1903    Narrative:      Examination: XR CHEST 1 VW-     Date of Exam: 1/14/2021 6:36 PM     Indication: Dyspnea.       Comparison: None available.     Technique: 1 view of the chest      FINDINGS:  There is cardiomegaly. There has been a coronary artery bypass. There  are 3 pacer leads in the heart. There our moderate airspace opacities in  the left mid-lower lung and in the right lower lung. Right pleural  effusion is difficult to exclude. Negative for pneumothorax. No bone  abnormality noted.       Impression:      1. There are bilateral airspace opacities which are consistent with  pneumonia and/or pulmonary edema.  2. Cardiomegaly.     Electronically Signed By-Kristi Menon MD On:1/14/2021 7:01 PM  This report was finalized on [id number] by  Kristi Menon MD.          Results for orders placed during the hospital encounter of 01/14/21   XR Chest 1 View    Narrative Examination: XR CHEST 1 VW-     Date of Exam: 1/14/2021 6:36 PM     Indication: Dyspnea.       Comparison: None available.     Technique: 1 view of the chest      FINDINGS:  There is cardiomegaly. There has been a coronary artery bypass. There  are 3 pacer leads in the heart. There our moderate airspace opacities in  the left mid-lower lung and in the right lower lung. Right pleural  effusion is difficult to exclude. Negative for pneumothorax. No bone  abnormality noted.       Impression 1. There are bilateral airspace opacities which are consistent with  pneumonia and/or pulmonary edema.  2. Cardiomegaly.     Electronically Signed By-Kristi Menon MD On:1/14/2021 7:01 PM  This report was finalized on [id number] by  Kristi Menon MD.              ASSESSMENT / PLAN      Sepsis (CMS/HCC)    Acute kidney injury superimposed on CKD (CMS/HCC)    1. ARF/SAMIR/CRF/CKD STG  3-------Nonoliguric. BUN/Cr up a little but appears to be leveling off. +ARF/SAMIR on top of known CRF/CKD STG 3A with what appears to be a baseline creatinine of 1.3 based on review of old labs. Unknown if patient has baseline proteinuria or hematuria. CRF/CKD STG 3A is likely secondary to DGS vs. HTN NS. +ARF/SAMIR is secondary to prerenal state/intravascular volume depletion with concomitant Lasix use (BNP is clinically inaccurate) and hemodynamic fluctuation from decompensated Pulmonary state. Hold Lasix. Hydrate GENTLY.  No NSAIDs or IV dye. D/C prn Ibuprofen. Dose meds for CrCl less than 10 cc/min until ARF/SAMIR is resolved.      2. COVID 19 + PNA-------Blood cx pending. Qualifying for Remdesivir. Per PCP/Pulmonary. On Zinc, Vitamin D, Vitamin B1/B-12/B-6. On Levaquin also     3. ANEMIA-------SPEP pending. H/H stable     4. METABOLIC ALKALOSIS-----Better with holding Lasix and hydrating     5. OA/DJD------No NSAIDs. On Allopurinol     6. BPH AND H/O NEPHROLITHIASIS-------PVR okay     7. H/O CHF/ISCHEMIC CM-------Currently without gross overload clinically despite elevated BNP. Hold Lasix and hydrate GENTLY for reasons stated above     8. DMII-------BS okay. Glucometers, SSI     9. GERD/PUD PROPHYLAXIS------Renal dose adjusted Pepcid     10. HYPERLIPIDEMIA-----On Statin. Check CK, TSH     11. ELEVATED D-DIMER/DVT PROPHYLAXIS-----SCD b/c of thrombocytopenia (Per PCP)     12. THROMBOCYTOPENIA-----SPEP pending    13. HYPOCALCEMIA------Replace IV    14. HYPOKALEMIA------Replace po and follow        Anastacia Gallegos MD  [facility]  [phone]  01/16/21  07:48 EST

## 2021-01-16 NOTE — PROGRESS NOTES
"      [facility] Medicine Services Daily Progress Note      Hospitalist Team  LOS 0 days      Patient Care Team:  Provider, No Known as PCP - Felicitas Poole MD as Consulting Physician (Nephrology)    Patient Location: 205/1      Subjective   Subjective     Chief Complaint / Subjective  Chief Complaint   Patient presents with   • Exposure To Known Illness         Brief Synopsis of Hospital Course/HPI    75-year-old  male with history of countless medical problems as outlined below, presented to the ED complaining of increasing shortness of breath over the past several days.  States shortness of breath is much worse on exertion.  Patient was diagnosed with Covid symptomatic over the past 7 to 8 days.  He also has productive cough with \"nasty phlegm \", fever, sweating, and nausea without vomiting.  He denies other complaint.     Emergency department course:  Febrile with initial temperature 103.1, hemodynamically stable, no acute distress.  Physical examination per ED physician was significant for presence of rhonchi at the bases.  ABG analysis on FiO2 32% showed a pH of 7.549, PCO2 35, PO2 68.5 and O2 saturation 95.6.  Labs were significant for blood glucose level 189, creatinine 1.79, BUN 36, and GFR 37.  WBC count 6.9 with neutrophils 94.1% and lymphocyte 1.7%.  Lactate level was normal at 1.9.  Chest x-ray reported \". There are bilateral airspace opacities which are consistent with pneumonia and/or pulmonary edema. 2. Cardiomegaly\".  Patient was given 1000 mg Tylenol, 800 mg ibuprofen, 750 mg IV levofloxacin and placed on supplemental O2 with 2 L nasal cannula.    Date::    1/15/2021 Pt wanting to leave AMA, was convinced to stay given severity of illness      Review of Systems   Constitution: Positive for fever and night sweats. Negative for chills.   Cardiovascular: Positive for dyspnea on exertion.   Respiratory: Positive for cough and sputum production.  " "  Gastrointestinal: Positive for nausea. Negative for vomiting.   All other systems reviewed and are negative.        Objective   Objective      Vital Signs  Temp:  [97.7 °F (36.5 °C)-99.8 °F (37.7 °C)] 97.8 °F (36.6 °C)  Heart Rate:  [] 102  Resp:  [15-19] 17  BP: ()/(48-75) 95/48  Oxygen Therapy  SpO2: 100 %  Pulse Oximetry Type: Continuous  Device (Oxygen Therapy): nasal cannula  Flow (L/min): 4  Flowsheet Rows      First Filed Value   Admission Height  137.2 cm (54\") Documented at 01/14/2021 1813   Admission Weight  79.4 kg (175 lb) Documented at 01/14/2021 1813        Intake & Output (last 3 days)       01/13 0701 - 01/14 0700 01/14 0701 - 01/15 0700 01/15 0701 - 01/16 0700    P.O.   300    IV Piggyback  150     Total Intake(mL/kg)  150 (1.9) 300 (3.8)    Net  +150 +300           Stool Unmeasured Occurrence   3 x        Lines, Drains & Airways    Active LDAs     Name:   Placement date:   Placement time:   Site:   Days:    Peripheral IV 01/14/21 1824 Left Antecubital   01/14/21 1824    Antecubital   1                  Physical Exam:    Physical Exam    General: well-developed and well-nourished, NAD  HEENT: NC/AT, EOMI, PERRLA  Heart: RRR. No murmur   Chest: CTAB, no w/r/r, normal respiratory effort  Abdominal: Soft. NT/ND. Bowel sounds present  Musculoskeletal: Normal ROM.  No edema. No calf tenderness.  Neurological: AAOx3, no focal deficits  Skin: Skin is warm and dry. No rash  Psychiatric: Normal mood and affect.       Wounds (last 24 hours)      LDA Wound     Row Name 01/15/21 1316 01/15/21 1315          [REMOVED] Wound 08/02/18 1200 Other (See comments) abdomen    Wound - Properties Group Placement Date: 08/02/18  -TS Placement Time: 1200  -TS Side: Other (See comments)  -TS Location: abdomen  -TS Removal Date: 01/15/21  -KA Removal Time: 1315  -KA Type: incision  -TS    Wound Image  --  -- not present on arrival  -KA     Retired Wound - Properties Group Date first assessed: 08/02/18  -TS " Time first assessed: 1200  -TS Side: Other (See comments)  -TS Location: abdomen  -TS Resolution Date: 01/15/21  -KA Resolution Time: 1315  -KA Type: incision  -TS       [REMOVED] Wound 08/02/18 1200 Left eye    Wound - Properties Group Placement Date: 08/02/18  -TS Placement Time: 1200  -TS Side: Left  -TS Location: eye  -TS Removal Date: 01/15/21  -KA Removal Time: 1316  -KA Type: incision  -TS    Wound Image  -- not present on arrival  -KA  --     Retired Wound - Properties Group Date first assessed: 08/02/18  -TS Time first assessed: 1200  -TS Side: Left  -TS Location: eye  -TS Resolution Date: 01/15/21  -KA Resolution Time: 1316  -KA Type: incision  -TS      User Key  (r) = Recorded By, (t) = Taken By, (c) = Cosigned By    Initials Name Provider Type    TS Jacques Jacobo RN Registered Nurse    Teresa Neff RN Registered Nurse    Teresa Neff RN Registered Nurse          Procedures:              Results Review:     I reviewed the patient's new clinical results.      COVID-19 LAB PANEL     COVID-19 test result  COVID19   Date Value Ref Range Status   01/15/2021 Detected (C) Not Detected - Ref. Range Final       COVID-19 Prognostic lab results  WBC with differential  Results from last 7 days   Lab Units 01/14/21  1825   WBC 10*3/mm3 6.90   PLATELETS 10*3/mm3 128*   NEUTROPHIL % % 94.1*   LYMPHOCYTE % % 1.7*   NEUTROS ABS 10*3/mm3 6.50   LYMPHS ABS 10*3/mm3 0.10*     Inflammatory markers  Results from last 7 days   Lab Units 01/15/21  0038 01/14/21  1825   CRP mg/dL 4.42* 3.01*   FERRITIN ng/mL 2,056.00*  --    CK TOTAL U/L 52  --    D DIMER QUANT mg/L (FEU)  --  1.01*   PROCALCITONIN ng/mL  --  0.29*   LDH U/L 413*  --    ALK PHOS U/L 278*  --    AST (SGOT) U/L 26  --    ALT (SGPT) U/L 19  --        Poor COVID-19 outcomes associated with:  Neutrophil:Lymphocyte ratio >3.5  Thrombocytopenia  LFT's >5x upper limit of normal  LDH >400      Lab Results (last 24 hours)     Procedure Component Value  Units Date/Time    POC Glucose Once [[id number]]  (Abnormal) Collected: 01/15/21 1949    Specimen: Blood Updated: 01/15/21 1950     Glucose 174 mg/dL      Comment: Serial Number: [id number]:  [id number]       Blood Culture - Blood, Arm, Right [[id number]] Collected: 01/14/21 1827    Specimen: Blood from Arm, Right Updated: 01/15/21 1846     Blood Culture No growth at 24 hours    Blood Culture - Blood, Arm, Left [[id number]] Collected: 01/14/21 1827    Specimen: Blood from Arm, Left Updated: 01/15/21 1846     Blood Culture No growth at 24 hours    POC Glucose Once [[id number]]  (Abnormal) Collected: 01/15/21 1613    Specimen: Blood Updated: 01/15/21 1615     Glucose 124 mg/dL      Comment: Serial Number: [id number]:  [id number]       Respiratory Panel PCR w/COVID-19(SARS-CoV-2) SIMRAN/KEYSHAWN/SKYLA/PAD/COR/MAD/SUDHIR In-House, NP Swab in UTM/VTM, 3-4 HR TAT - Swab, Nasopharynx [[id number]]  (Abnormal) Collected: 01/15/21 1206    Specimen: Swab from Nasopharynx Updated: 01/15/21 1316     ADENOVIRUS, PCR Not Detected     Coronavirus 229E Not Detected     Coronavirus HKU1 Not Detected     Coronavirus NL63 Not Detected     Coronavirus OC43 Not Detected     COVID19 Detected     Human Metapneumovirus Not Detected     Human Rhinovirus/Enterovirus Not Detected     Influenza A PCR Not Detected     Influenza B PCR Not Detected     Parainfluenza Virus 1 Not Detected     Parainfluenza Virus 2 Not Detected     Parainfluenza Virus 3 Not Detected     Parainfluenza Virus 4 Not Detected     RSV, PCR Not Detected     Bordetella pertussis pcr Not Detected     Bordetella parapertussis PCR Not Detected     Chlamydophila pneumoniae PCR Not Detected     Mycoplasma pneumo by PCR Not Detected    Narrative:      Fact sheet for providers: https://docs.[employer]/wp-content/uploads/JOY2779-9959-XH2.1-EUA-Provider-Fact-Sheet-3.pdf    Fact sheet for patients:  https://docs.[employer]/wp-content/uploads/WVH9355-9130-SJ6.1-EUA-Patient-Fact-Sheet-1.pdf    Test performed by PCR.    Ethanol [[id number]] Collected: 01/15/21 1242    Specimen: Blood Updated: 01/15/21 1312     Ethanol % <0.010 %     Narrative:      Plasma Ethanol Clinical Symptoms:    ETOH (%)               Clinical Symptom  .01-.05              No apparent influence  .03-.12              Euphoria, Diminished judgment and attention   .09-.25              Impaired comprehension, Muscle incoordination  .18-.30              Confusion, Staggered gait, Slurred speech  .25-.40              Markedly decreased response to stimuli, unable to stand or                        walk, vomitting, sleep or stupor  .35-.50              Comatose, Anesthesia, Subnormal body temperature        Protein Elec + Interp, Serum [[id number]] Collected: 01/15/21 1242    Specimen: Blood Updated: 01/15/21 1253    Blood Culture - Blood, Arm, Left [[id number]] Collected: 01/15/21 1242    Specimen: Blood from Arm, Left Updated: 01/15/21 1253    TSH [[id number]]  (Normal) Collected: 01/15/21 0038    Specimen: Blood Updated: 01/15/21 1252     TSH 0.339 uIU/mL     CK [[id number]]  (Normal) Collected: 01/15/21 0038    Specimen: Blood Updated: 01/15/21 1248     Creatine Kinase 52 U/L     Uric Acid [[id number]]  (Abnormal) Collected: 01/15/21 0038    Specimen: Blood Updated: 01/15/21 1248     Uric Acid 7.2 mg/dL     Iron [[id number]]  (Abnormal) Collected: 01/15/21 0038    Specimen: Blood Updated: 01/15/21 1248     Iron 18 mcg/dL     PTH, Intact [[id number]]  (Abnormal) Collected: 01/15/21 0038    Specimen: Blood Updated: 01/15/21 1243     PTH, Intact 240.2 pg/mL     Narrative:      Results may be falsely decreased if patient taking Biotin.      C-reactive Protein [[id number]]  (Abnormal) Collected: 01/15/21 0038    Specimen: Blood Updated: 01/15/21 1153     C-Reactive Protein 4.42 mg/dL     POC Glucose Once [[id number]]  (Abnormal) Collected: 01/15/21 0656     Specimen: Blood Updated: 01/15/21 0657     Glucose 111 mg/dL      Comment: Serial Number: [id number]:  [id number]       POC Glucose Once [[id number]]  (Abnormal) Collected: 01/15/21 0316    Specimen: Blood Updated: 01/15/21 0317     Glucose 197 mg/dL      Comment: Serial Number: [id number]:  [id number]       Ferritin [[id number]]  (Abnormal) Collected: 01/15/21 0038    Specimen: Blood Updated: 01/15/21 0209     Ferritin 2,056.00 ng/mL     Narrative:      Results may be falsely decreased if patient taking Biotin.      [place] Draw [[id number]] Collected: 01/15/21 0038    Specimen: Blood Updated: 01/15/21 0145    Narrative:      The following orders were created for panel order [place] Draw.  Procedure                               Abnormality         Status                     ---------                               -----------         ------                     Light Blue Top[[id number]]                                   Final result               Green Top (Gel)[[id number]]                                  Final result               Lavender Top[[id number]]                                     Final result               Gold Top - SST[[id number]]                                   Final result                 Please view results for these tests on the individual orders.    Light Blue Top [[id number]] Collected: 01/15/21 0038    Specimen: Blood Updated: 01/15/21 0145     Extra Tube hold for add-on     Comment: Auto resulted       Lavender Top [[id number]] Collected: 01/15/21 0038    Specimen: Blood Updated: 01/15/21 0145     Extra Tube hold for add-on     Comment: Auto resulted       Gold Top - SST [[id number]] Collected: 01/15/21 0038    Specimen: Blood Updated: 01/15/21 0145     Extra Tube Hold for add-ons.     Comment: Auto resulted.       Lactate Dehydrogenase [[id number]]  (Abnormal) Collected: 01/15/21 0038    Specimen: Blood Updated: 01/15/21 0138      U/L      Comment: Specimen hemolyzed.  Results may  "be affected.       Green Top (Gel) [[id number]] Collected: 01/15/21 0038    Specimen: Blood Updated: 01/15/21 0116     Extra Tube --        No results found for: HGBA1C        Results from last 7 days   Lab Units 01/14/21  1858   PH, ARTERIAL pH units 7.549*   PO2 ART mm Hg 68.5*   PCO2, ARTERIAL mm Hg 35.0   HCO3 ART mmol/L 30.6*     No results found for: LIPASE  Lab Results   Component Value Date    CHLPL 95 02/17/2018    TRIG 176 (H) 02/17/2018    HDL 18 (L) 02/17/2018    LDL 42 02/17/2018       Lab Results   Lab Value Date/Time    FINALDX  08/02/2018 1140     \"LEFT OPTHALMIC SOCKET\":        BENIGN FIBROUS TISSUE/SCAR.        FOREIGN BODY MATERIAL (GROSS ONLY) - CONSISTENT WITH HYDROXYAPATITE.        CPT: 84321         Microbiology Results (last 10 days)     Procedure Component Value - Date/Time    Respiratory Panel PCR w/COVID-19(SARS-CoV-2) SIMRAN/KEYSHAWN/SKYLA/PAD/COR/MAD/SUDHIR In-House, NP Swab in UTM/VTM, 3-4 HR TAT - Swab, Nasopharynx [[id number]]  (Abnormal) Collected: 01/15/21 1206    Lab Status: Final result Specimen: Swab from Nasopharynx Updated: 01/15/21 1316     ADENOVIRUS, PCR Not Detected     Coronavirus 229E Not Detected     Coronavirus HKU1 Not Detected     Coronavirus NL63 Not Detected     Coronavirus OC43 Not Detected     COVID19 Detected     Human Metapneumovirus Not Detected     Human Rhinovirus/Enterovirus Not Detected     Influenza A PCR Not Detected     Influenza B PCR Not Detected     Parainfluenza Virus 1 Not Detected     Parainfluenza Virus 2 Not Detected     Parainfluenza Virus 3 Not Detected     Parainfluenza Virus 4 Not Detected     RSV, PCR Not Detected     Bordetella pertussis pcr Not Detected     Bordetella parapertussis PCR Not Detected     Chlamydophila pneumoniae PCR Not Detected     Mycoplasma pneumo by PCR Not Detected    Narrative:      Fact sheet for providers: https://docs.[employer]/wp-content/uploads/CHR2967-9983-EC2.1-EUA-Provider-Fact-Sheet-3.pdf    Fact sheet for patients: " https://docs.[employer]/wp-content/uploads/EQW3175-6591-EL6.1-EUA-Patient-Fact-Sheet-1.pdf    Test performed by PCR.    Blood Culture - Blood, Arm, Right [[id number]] Collected: 01/14/21 1827    Lab Status: Preliminary result Specimen: Blood from Arm, Right Updated: 01/15/21 1846     Blood Culture No growth at 24 hours    Blood Culture - Blood, Arm, Left [[id number]] Collected: 01/14/21 1827    Lab Status: Preliminary result Specimen: Blood from Arm, Left Updated: 01/15/21 1846     Blood Culture No growth at 24 hours          ECG/EMG Results (most recent)     Procedure Component Value Units Date/Time    ECG 12 Lead [[id number]] Collected: 01/14/21 2155     Updated: 01/15/21 1738     QT Interval 427 ms     Narrative:      HEART RATE= 90  bpm  RR Interval= 667  ms  MD Interval=   ms  P Horizontal Axis=   deg  P Front Axis= 0  deg  QRSD Interval= 134  ms  QT Interval= 427  ms  QRS Axis= -45  deg  T Wave Axis= 157  deg  - ABNORMAL ECG -  Ventricular-paced rhythm  Biventricular paced rhythm  When compared with ECG of 25-Jul-2018 8:56:31,  Significant change in rhythm  Electronically Signed By: Heber Garduno ([facility]) 15-Albert-2021 17:36:17  Date and Time of Study: 2021-01-14 21:55:52                    Xr Chest 1 View    Result Date: 1/14/2021  1. There are bilateral airspace opacities which are consistent with pneumonia and/or pulmonary edema. 2. Cardiomegaly.  Electronically Signed By-Kristi Menon MD On:1/14/2021 7:01 PM This report was finalized on [id number] by  Kristi Menon MD.    Us Renal Bilateral    Result Date: 1/15/2021  1. Negative for hydronephrosis. 2. There is moderate right renal cortical thinning. There is mild left renal cortical thinning. 2. Small incidental renal cysts.  Electronically Signed By-Kristi Mneon MD On:1/15/2021 10:18 PM This report was finalized on [id number] by  Kristi Menon MD.          Xrays, labs reviewed personally by physician.    Medication Review:   I have reviewed the  patient's current medication list      Scheduled Meds  allopurinol, 300 mg, Oral, Daily  ascorbic acid, 500 mg, Oral, Q6H  aspirin, 81 mg, Oral, Daily  atorvastatin, 40 mg, Oral, Nightly  carvedilol, 37.5 mg, Oral, Q12H  cholecalciferol, 1,000 Units, Oral, Daily  dexamethasone, 6 mg, Oral, Daily With Breakfast  doxycycline, 100 mg, Oral, Q12H  famotidine, 20 mg, Oral, Daily  insulin glargine, 10 Units, Subcutaneous, Nightly  insulin lispro, 0-9 Units, Subcutaneous, TID AC  isosorbide mononitrate, 30 mg, Oral, Daily  levoFLOXacin, 750 mg, Intravenous, Q48H  LORazepam, 0.5 mg, Oral, Q6H    Followed by  LORazepam, 0.5 mg, Oral, Q8H  remdesivir, 100 mg, Intravenous, Q24H  sodium chloride, 10 mL, Intravenous, Q12H  thiamine, 100 mg, Oral, Daily  vitamin B-12, 1,000 mcg, Oral, Daily  vitamin B-6, 100 mg, Oral, Daily  zinc sulfate, 440 mg, Oral, Daily        Meds Infusions  Pharmacy Consult - Remdesivir,   Pharmacy Consult - Steroid Insulin Protocol,   sodium chloride, 60 mL/hr, Last Rate: 60 mL/hr (01/15/21 0104)        Meds PRN  •  acetaminophen  •  dextrose  •  dextrose  •  glucagon (human recombinant)  •  guaiFENesin-dextromethorphan  •  HYDROcodone-acetaminophen  •  insulin lispro **AND** insulin lispro  •  LORazepam **OR** LORazepam **OR** LORazepam **OR** LORazepam **OR** LORazepam **OR** LORazepam  •  ondansetron  •  Pharmacy Consult - Remdesivir  •  Pharmacy Consult - Steroid Insulin Protocol  •  [COMPLETED] Insert peripheral IV **AND** sodium chloride  •  sodium chloride        Assessment/Plan   Assessment/Plan     Active Hospital Problems    Diagnosis  POA   • Sepsis (CMS/[facility]) [A41.9]  Yes   • Acute kidney injury superimposed on CKD (CMS/[facility]) [N17.9, N18.9]  Yes      Resolved Hospital Problems   No resolved problems to display.       MEDICAL DECISION MAKING COMPLEXITY BY PROBLEM:     COVID-19 infection with pneumonia  -- 01/16/21 -- COVID-19 test positive  -- supplemental O2 to keep sats >93% ; currently on   --  allow permissive hypoxia to sats >86%  -- pronate patient as tolerated for better oxygenation  -- anti-inflammatory supplements/treatments (per [facility] COVID-19 protocol)     -- Vitamin C 500mg PO q6h     -- Vitamin D3 20,000-60,000 iu x1 (200-500 mcg) then 20,000 iu (200 mcg) weekly     -- Zinc gluconate 100 mg Daily (zinc sulfate 440 mg)     -- Melatonin 10mg nightly     -- B complex vitamins (B12, B6)     -- Famotidine 40mg/day     -- Enoxaparin as per pharmacy protocol     -- Steroids: Dexamethasone     -- Remdesivir: 5-day course initiated 1/15/2021 through 1/19/2021     -- Ivermectin 12 mg on 1/15/2021, 15 mg on 1/17/2021  -- empiric antibiotics: Doxycycline and Levaquin  -- admission labs: PCT, CRP, IL-6, BNP, Trop, Ferritin, D-dimer, Mg, Neutrophil/Lymphocyte ratio  -- monitor daily labs     -- CMP, CBC, CRP, Ferritin, PCT, D-dimer  -- continue supportive care as needed: antipyretics, breathing treatments, mucolytics, etc.    SAMIR on CKD stage 3b  -- hold lisinopril and diuretics  -- Consult Nephrology  -- continue to monitor BMP    Diabetes mellitus type 2  -- poorly controlled  -- SSI coverage  -- AccuCheks AC/HS    Remaining chronic conditions as per H&P with home medications for management.    VTE Prophylaxis -   Mechanical Order History:      Ordered        01/15/21 1257  Place Sequential Compression Device  Once         01/15/21 1257  Maintain Sequential Compression Device  Continuous                 Pharmalogical Order History:      Ordered     Dose Route Frequency Stop    01/15/21 0027  dabigatran etexilate (PRADAXA) capsule 150 mg  Status:  Discontinued      150 mg PO 2 Times Daily 01/15/21 1109                  Code Status -   Code Status and Medical Interventions:   Ordered at: 01/14/21 7917     Code Status:    CPR     Medical Interventions (Level of Support Prior to Arrest):    Full       This patient has been examined wearing appropriate Personal Protective Equipment. 01/16/21        Discharge  Planning    Pending clinical course        Electronically signed by Tiffany Guardado MD, 01/16/21, 01:10 EST.  [facility] Hospitalist Team

## 2021-01-16 NOTE — NURSING NOTE
Review with APRN, of pt c/o loose stool and admit with temp 103 and wbc WDL, on oral and IV ABX..  Place pt in contact spore isolation   Pt inform of need of isloaion

## 2021-01-17 LAB
ALBUMIN SERPL-MCNC: 3.4 G/DL (ref 3.5–5.2)
ALBUMIN/GLOB SERPL: 1.3 G/DL
ALP SERPL-CCNC: 266 U/L (ref 39–117)
ALT SERPL W P-5'-P-CCNC: 20 U/L (ref 1–41)
ANION GAP SERPL CALCULATED.3IONS-SCNC: 8 MMOL/L (ref 5–15)
AST SERPL-CCNC: 45 U/L (ref 1–40)
BASOPHILS # BLD AUTO: 0 10*3/MM3 (ref 0–0.2)
BASOPHILS NFR BLD AUTO: 0.1 % (ref 0–1.5)
BILIRUB SERPL-MCNC: 0.8 MG/DL (ref 0–1.2)
BUN SERPL-MCNC: 49 MG/DL (ref 8–23)
BUN/CREAT SERPL: 31.2 (ref 7–25)
CALCIUM SPEC-SCNC: 9.4 MG/DL (ref 8.6–10.5)
CHLORIDE SERPL-SCNC: 100 MMOL/L (ref 98–107)
CK SERPL-CCNC: 232 U/L (ref 20–200)
CO2 SERPL-SCNC: 28 MMOL/L (ref 22–29)
CREAT SERPL-MCNC: 1.57 MG/DL (ref 0.76–1.27)
CRP SERPL-MCNC: 8.03 MG/DL (ref 0–0.5)
DEPRECATED RDW RBC AUTO: 53.8 FL (ref 37–54)
EOSINOPHIL # BLD AUTO: 0 10*3/MM3 (ref 0–0.4)
EOSINOPHIL NFR BLD AUTO: 0 % (ref 0.3–6.2)
ERYTHROCYTE [DISTWIDTH] IN BLOOD BY AUTOMATED COUNT: 17.6 % (ref 12.3–15.4)
FERRITIN SERPL-MCNC: 3769 NG/ML (ref 30–400)
GFR SERPL CREATININE-BSD FRML MDRD: 43 ML/MIN/1.73
GLOBULIN UR ELPH-MCNC: 2.6 GM/DL
GLUCOSE BLDC GLUCOMTR-MCNC: 156 MG/DL (ref 70–105)
GLUCOSE BLDC GLUCOMTR-MCNC: 172 MG/DL (ref 70–105)
GLUCOSE BLDC GLUCOMTR-MCNC: 210 MG/DL (ref 70–105)
GLUCOSE BLDC GLUCOMTR-MCNC: 233 MG/DL (ref 70–105)
GLUCOSE SERPL-MCNC: 187 MG/DL (ref 65–99)
HCT VFR BLD AUTO: 36.1 % (ref 37.5–51)
HGB BLD-MCNC: 11.9 G/DL (ref 13–17.7)
LYMPHOCYTES # BLD AUTO: 0.2 10*3/MM3 (ref 0.7–3.1)
LYMPHOCYTES NFR BLD AUTO: 4.9 % (ref 19.6–45.3)
MAGNESIUM SERPL-MCNC: 2.3 MG/DL (ref 1.6–2.4)
MCH RBC QN AUTO: 28.8 PG (ref 26.6–33)
MCHC RBC AUTO-ENTMCNC: 32.9 G/DL (ref 31.5–35.7)
MCV RBC AUTO: 87.4 FL (ref 79–97)
MONOCYTES # BLD AUTO: 0.2 10*3/MM3 (ref 0.1–0.9)
MONOCYTES NFR BLD AUTO: 4.5 % (ref 5–12)
NEUTROPHILS NFR BLD AUTO: 3.6 10*3/MM3 (ref 1.7–7)
NEUTROPHILS NFR BLD AUTO: 90.5 % (ref 42.7–76)
NRBC BLD AUTO-RTO: 0 /100 WBC (ref 0–0.2)
PHOSPHATE SERPL-MCNC: 3 MG/DL (ref 2.5–4.5)
PLATELET # BLD AUTO: 118 10*3/MM3 (ref 140–450)
PMV BLD AUTO: 9.3 FL (ref 6–12)
POTASSIUM SERPL-SCNC: 3.6 MMOL/L (ref 3.5–5.2)
PROT SERPL-MCNC: 6 G/DL (ref 6–8.5)
RBC # BLD AUTO: 4.13 10*6/MM3 (ref 4.14–5.8)
SODIUM SERPL-SCNC: 136 MMOL/L (ref 136–145)
WBC # BLD AUTO: 4 10*3/MM3 (ref 3.4–10.8)

## 2021-01-17 PROCEDURE — 82962 GLUCOSE BLOOD TEST: CPT

## 2021-01-17 PROCEDURE — 84100 ASSAY OF PHOSPHORUS: CPT | Performed by: INTERNAL MEDICINE

## 2021-01-17 PROCEDURE — 85025 COMPLETE CBC W/AUTO DIFF WBC: CPT | Performed by: INTERNAL MEDICINE

## 2021-01-17 PROCEDURE — 86140 C-REACTIVE PROTEIN: CPT | Performed by: INTERNAL MEDICINE

## 2021-01-17 PROCEDURE — 83735 ASSAY OF MAGNESIUM: CPT | Performed by: INTERNAL MEDICINE

## 2021-01-17 PROCEDURE — 63710000001 INSULIN LISPRO (HUMAN) PER 5 UNITS: Performed by: INTERNAL MEDICINE

## 2021-01-17 PROCEDURE — 63710000001 INSULIN GLARGINE PER 5 UNITS: Performed by: INTERNAL MEDICINE

## 2021-01-17 PROCEDURE — 82550 ASSAY OF CK (CPK): CPT | Performed by: INTERNAL MEDICINE

## 2021-01-17 PROCEDURE — 99232 SBSQ HOSP IP/OBS MODERATE 35: CPT | Performed by: INTERNAL MEDICINE

## 2021-01-17 PROCEDURE — 80053 COMPREHEN METABOLIC PANEL: CPT | Performed by: INTERNAL MEDICINE

## 2021-01-17 PROCEDURE — 25010000002 HEPARIN (PORCINE) PER 1000 UNITS: Performed by: INTERNAL MEDICINE

## 2021-01-17 PROCEDURE — 87070 CULTURE OTHR SPECIMN AEROBIC: CPT | Performed by: INTERNAL MEDICINE

## 2021-01-17 PROCEDURE — 82728 ASSAY OF FERRITIN: CPT | Performed by: INTERNAL MEDICINE

## 2021-01-17 PROCEDURE — G0378 HOSPITAL OBSERVATION PER HR: HCPCS

## 2021-01-17 PROCEDURE — 87205 SMEAR GRAM STAIN: CPT | Performed by: INTERNAL MEDICINE

## 2021-01-17 RX ORDER — POTASSIUM CHLORIDE 20 MEQ/1
20 TABLET, EXTENDED RELEASE ORAL ONCE
Status: COMPLETED | OUTPATIENT
Start: 2021-01-17 | End: 2021-01-17

## 2021-01-17 RX ADMIN — IVERMECTIN 15 MG: 3 TABLET ORAL at 12:10

## 2021-01-17 RX ADMIN — INSULIN GLARGINE 10 UNITS: 100 INJECTION, SOLUTION SUBCUTANEOUS at 21:04

## 2021-01-17 RX ADMIN — POTASSIUM CHLORIDE 40 MEQ: 1500 TABLET, EXTENDED RELEASE ORAL at 05:47

## 2021-01-17 RX ADMIN — CARVEDILOL 12.5 MG: 6.25 TABLET, FILM COATED ORAL at 17:20

## 2021-01-17 RX ADMIN — HEPARIN SODIUM 7500 UNITS: 5000 INJECTION INTRAVENOUS; SUBCUTANEOUS at 08:11

## 2021-01-17 RX ADMIN — DOXYCYCLINE 100 MG: 100 TABLET, FILM COATED ORAL at 08:10

## 2021-01-17 RX ADMIN — OXYCODONE HYDROCHLORIDE AND ACETAMINOPHEN 500 MG: 500 TABLET ORAL at 17:21

## 2021-01-17 RX ADMIN — POTASSIUM CHLORIDE 20 MEQ: 1500 TABLET, EXTENDED RELEASE ORAL at 12:10

## 2021-01-17 RX ADMIN — HEPARIN SODIUM 7500 UNITS: 5000 INJECTION INTRAVENOUS; SUBCUTANEOUS at 20:54

## 2021-01-17 RX ADMIN — ATORVASTATIN CALCIUM 40 MG: 40 TABLET, FILM COATED ORAL at 20:53

## 2021-01-17 RX ADMIN — ISOSORBIDE MONONITRATE 30 MG: 30 TABLET, EXTENDED RELEASE ORAL at 08:10

## 2021-01-17 RX ADMIN — Medication 100 MG: at 08:10

## 2021-01-17 RX ADMIN — ASPIRIN 81 MG: 81 TABLET, CHEWABLE ORAL at 08:10

## 2021-01-17 RX ADMIN — Medication 1000 UNITS: at 08:10

## 2021-01-17 RX ADMIN — DOXYCYCLINE 100 MG: 100 TABLET, FILM COATED ORAL at 20:53

## 2021-01-17 RX ADMIN — Medication 10 ML: at 20:54

## 2021-01-17 RX ADMIN — OXYCODONE HYDROCHLORIDE AND ACETAMINOPHEN 500 MG: 500 TABLET ORAL at 23:46

## 2021-01-17 RX ADMIN — OXYCODONE HYDROCHLORIDE AND ACETAMINOPHEN 500 MG: 500 TABLET ORAL at 12:31

## 2021-01-17 RX ADMIN — INSULIN LISPRO 4 UNITS: 100 INJECTION, SOLUTION INTRAVENOUS; SUBCUTANEOUS at 20:57

## 2021-01-17 RX ADMIN — FAMOTIDINE 20 MG: 20 TABLET, FILM COATED ORAL at 08:10

## 2021-01-17 RX ADMIN — CARVEDILOL 12.5 MG: 6.25 TABLET, FILM COATED ORAL at 08:10

## 2021-01-17 RX ADMIN — CYANOCOBALAMIN TAB 1000 MCG 1000 MCG: 1000 TAB at 08:10

## 2021-01-17 RX ADMIN — DEXAMETHASONE 6 MG: 4 TABLET ORAL at 08:10

## 2021-01-17 RX ADMIN — ZINC SULFATE 220 MG (50 MG) CAPSULE 440 MG: CAPSULE at 08:10

## 2021-01-17 RX ADMIN — Medication 10 ML: at 08:11

## 2021-01-17 RX ADMIN — ALLOPURINOL 300 MG: 300 TABLET ORAL at 08:10

## 2021-01-17 RX ADMIN — SODIUM CHLORIDE 100 MG: 9 INJECTION, SOLUTION INTRAVENOUS at 17:20

## 2021-01-17 RX ADMIN — OXYCODONE HYDROCHLORIDE AND ACETAMINOPHEN 500 MG: 500 TABLET ORAL at 05:47

## 2021-01-17 NOTE — NURSING NOTE
"Pt refused insulin, per pt \" I dont use insulin because I use pills\"   Advise pt inportance to control blood sugar , pt verbalize understanding  "

## 2021-01-17 NOTE — PLAN OF CARE
Goal Outcome Evaluation:  Plan of Care Reviewed With: patient  Progress: no change     Patient requires 4L O2. Sats stable. No c/o pain.Will continue to monitor.

## 2021-01-17 NOTE — PROGRESS NOTES
"NEPHROLOGY PROGRESS NOTE------[facility]    [facility]  [phone]  Anastacia Gallegos MD      Patient Care Team:  Provider, No Known as PCP - Felicitas Poole MD as Consulting Physician (Nephrology)      Provider:  Anastacia Gallegos MD  Patient Name: Moe Lora  :  1945    SUBJECTIVE:    F/U ARF/SAMIR/CRF/CKD STG 3    Comfortable. No major new events.    Medication:  allopurinol, 300 mg, Oral, Daily  ascorbic acid, 500 mg, Oral, Q6H  aspirin, 81 mg, Oral, Daily  atorvastatin, 40 mg, Oral, Nightly  carvedilol, 12.5 mg, Oral, Daily With Breakfast & Dinner  cholecalciferol, 1,000 Units, Oral, Daily  dexamethasone, 6 mg, Oral, Daily With Breakfast  doxycycline, 100 mg, Oral, Q12H  famotidine, 20 mg, Oral, Daily  heparin (porcine), 7,500 Units, Subcutaneous, Q12H  insulin glargine, 10 Units, Subcutaneous, Nightly  insulin lispro, 0-9 Units, Subcutaneous, TID AC  isosorbide mononitrate, 30 mg, Oral, Daily  ivermectin, 15 mg, Oral, Once  levoFLOXacin, 750 mg, Intravenous, Q48H  LORazepam, 0.5 mg, Oral, Q8H  remdesivir, 100 mg, Intravenous, Q24H  sodium chloride, 10 mL, Intravenous, Q12H  thiamine, 100 mg, Oral, Daily  vitamin B-12, 1,000 mcg, Oral, Daily  vitamin B-6, 100 mg, Oral, Daily  zinc sulfate, 440 mg, Oral, Daily      Pharmacy Consult - Remdesivir,   Pharmacy Consult - Steroid Insulin Protocol,   Pharmacy to Dose Heparin,   sodium chloride, 60 mL/hr, Last Rate: Stopped (21)        OBJECTIVE    Vital Sign Min/Max for last 24 hours  Temp  Min: 97.6 °F (36.4 °C)  Max: 98.5 °F (36.9 °C)   BP  Min: 107/65  Max: 121/76   Pulse  Min: 101  Max: 118   Resp  Min: 16  Max: 20   SpO2  Min: 91 %  Max: 97 %   No data recorded   No data recorded     Flowsheet Rows      First Filed Value   Admission Height  137.2 cm (54\") Documented at 2021   Admission Weight  79.4 kg (175 lb) Documented at 2021          I/O this " shift:  In: -   Out: 600 [Urine:600]  I/O last 3 completed shifts:  In: 2698 [P.O.:1020; I.V.:1528; IV Piggyback:150]  Out: 1400 [Urine:1400]    Physical Exam:  General Appearance: alert, appears stated age and cooperative  Head: normocephalic, without obvious abnormality and atraumatic  Eyes: conjunctivae and sclerae normal and no icterus  Neck: supple and no JVD  Lungs: clear to auscultation and respirations regular  Heart: regular rhythm & normal rate and normal S1, S2 +PARUL  Chest: Wall no abnormalities observed  Abdomen: normal bowel sounds and soft non-tender  Extremities: moves extremities well, no edema, no cyanosis and no redness  Skin: no bleeding, bruising or rash, turgor normal, color normal and no lesions noted  Neurologic: Alert, and oriented. No focal deficits    Labs:    WBC WBC   Date Value Ref Range Status   01/17/2021 4.00 3.40 - 10.80 10*3/mm3 Final   01/16/2021 6.70 3.40 - 10.80 10*3/mm3 Final   01/14/2021 6.90 3.40 - 10.80 10*3/mm3 Final      HGB Hemoglobin   Date Value Ref Range Status   01/17/2021 11.9 (L) 13.0 - 17.7 g/dL Final   01/16/2021 12.4 (L) 13.0 - 17.7 g/dL Final   01/14/2021 12.1 (L) 13.0 - 17.7 g/dL Final      HCT Hematocrit   Date Value Ref Range Status   01/17/2021 36.1 (L) 37.5 - 51.0 % Final   01/16/2021 38.4 37.5 - 51.0 % Final   01/14/2021 37.3 (L) 37.5 - 51.0 % Final      Platlets No results found for: LABPLAT   MCV MCV   Date Value Ref Range Status   01/17/2021 87.4 79.0 - 97.0 fL Final   01/16/2021 88.1 79.0 - 97.0 fL Final   01/14/2021 88.4 79.0 - 97.0 fL Final          Sodium Sodium   Date Value Ref Range Status   01/17/2021 136 136 - 145 mmol/L Final   01/16/2021 136 136 - 145 mmol/L Final   01/14/2021 137 136 - 145 mmol/L Final      Potassium Potassium   Date Value Ref Range Status   01/17/2021 3.6 3.5 - 5.2 mmol/L Final   01/16/2021 3.1 (L) 3.5 - 5.2 mmol/L Final   01/14/2021 3.7 3.5 - 5.2 mmol/L Final      Chloride Chloride   Date Value Ref Range Status   01/17/2021  100 98 - 107 mmol/L Final   01/16/2021 95 (L) 98 - 107 mmol/L Final   01/14/2021 93 (L) 98 - 107 mmol/L Final      CO2 CO2   Date Value Ref Range Status   01/17/2021 28.0 22.0 - 29.0 mmol/L Final   01/16/2021 26.0 22.0 - 29.0 mmol/L Final   01/14/2021 33.0 (H) 22.0 - 29.0 mmol/L Final      BUN BUN   Date Value Ref Range Status   01/17/2021 49 (H) 8 - 23 mg/dL Final   01/16/2021 51 (H) 8 - 23 mg/dL Final   01/14/2021 36 (H) 8 - 23 mg/dL Final      Creatinine Creatinine   Date Value Ref Range Status   01/17/2021 1.57 (H) 0.76 - 1.27 mg/dL Final   01/16/2021 1.97 (H) 0.76 - 1.27 mg/dL Final   01/14/2021 1.79 (H) 0.76 - 1.27 mg/dL Final      Calcium Calcium   Date Value Ref Range Status   01/17/2021 9.4 8.6 - 10.5 mg/dL Final   01/16/2021 9.2 8.6 - 10.5 mg/dL Final   01/14/2021 9.6 8.6 - 10.5 mg/dL Final      PO4 No components found for: PO4   Albumin Albumin   Date Value Ref Range Status   01/17/2021 3.40 (L) 3.50 - 5.20 g/dL Final   01/16/2021 3.50 3.50 - 5.20 g/dL Final   01/15/2021 3.80 3.50 - 5.20 g/dL Final      Magnesium Magnesium   Date Value Ref Range Status   01/17/2021 2.3 1.6 - 2.4 mg/dL Final   01/16/2021 2.1 1.6 - 2.4 mg/dL Final      Uric Acid No components found for: URIC ACID     Imaging Results (Last 72 Hours)     Procedure Component Value Units Date/Time    US Renal Bilateral [[id number]] Collected: 01/15/21 2207     Updated: 01/15/21 2220    Narrative:      Examination:      Date of Exam: 1/15/2021 6:06 PM     Indication: ARF/CKD; A41.9-Sepsis, unspecified organism; U07.1-COVID-19;  J12.82-Pneumonia due to Coronavirus disease 2019.     Comparison: None available.     Technique: Grayscale and color Doppler ultrasound evaluation of the  kidneys and urinary bladder was performed     Findings:  The right kidney is about 9.5 cm in length by 4 x 3.5 cm. There is a 9  mm anechoic lesion in the lower pole consistent with a cyst. There is  moderate cortical thinning. Parenchymal echogenicity is similar to  the  liver. There is color flow in the right kidney. Negative for  hydronephrosis.     The left kidney is about 11.5 cm in length by 5.5 x 5 cm. There is an 11  mm anechoic lesion in the left kidney. There is mild cortical  thickening. There is color flow in the left kidney. Negative for  hydronephrosis.     Limited exam of the bladder shows volume of 20 cc.       Impression:      1. Negative for hydronephrosis.  2. There is moderate right renal cortical thinning. There is mild left  renal cortical thinning.  2. Small incidental renal cysts.     Electronically Signed By-Kristi Menon MD On:1/15/2021 10:18 PM  This report was finalized on [id number] by  Kristi Menon MD.    XR Chest 1 View [[id number]] Collected: 01/14/21 1900     Updated: 01/14/21 1903    Narrative:      Examination: XR CHEST 1 VW-     Date of Exam: 1/14/2021 6:36 PM     Indication: Dyspnea.       Comparison: None available.     Technique: 1 view of the chest      FINDINGS:  There is cardiomegaly. There has been a coronary artery bypass. There  are 3 pacer leads in the heart. There our moderate airspace opacities in  the left mid-lower lung and in the right lower lung. Right pleural  effusion is difficult to exclude. Negative for pneumothorax. No bone  abnormality noted.       Impression:      1. There are bilateral airspace opacities which are consistent with  pneumonia and/or pulmonary edema.  2. Cardiomegaly.     Electronically Signed By-Kristi Menon MD On:1/14/2021 7:01 PM  This report was finalized on [id number] by  Kristi Menon MD.          Results for orders placed during the hospital encounter of 01/14/21   XR Chest 1 View    Narrative Examination: XR CHEST 1 VW-     Date of Exam: 1/14/2021 6:36 PM     Indication: Dyspnea.       Comparison: None available.     Technique: 1 view of the chest      FINDINGS:  There is cardiomegaly. There has been a coronary artery bypass. There  are 3 pacer leads in the heart. There our moderate  airspace opacities in  the left mid-lower lung and in the right lower lung. Right pleural  effusion is difficult to exclude. Negative for pneumothorax. No bone  abnormality noted.       Impression 1. There are bilateral airspace opacities which are consistent with  pneumonia and/or pulmonary edema.  2. Cardiomegaly.     Electronically Signed By-Kristi Menon MD On:1/14/2021 7:01 PM  This report was finalized on [id number] by  Kristi Menon MD.              ASSESSMENT / PLAN      Sepsis (CMS/[facility])    Acute kidney injury superimposed on CKD (CMS/[facility])    1. ARF/SAMIR/CRF/CKD STG 3-------Nonoliguric. BUN/Cr down. +ARF/SAMIR on top of known CRF/CKD STG 3A with what appears to be a baseline creatinine of 1.3 based on review of old labs. Unknown if patient has baseline proteinuria or hematuria. CRF/CKD STG 3A is likely secondary to DGS vs. HTN NS. +ARF/SAMIR is secondary to prerenal state/intravascular volume depletion with concomitant Lasix use (BNP is clinically inaccurate) and hemodynamic fluctuation from decompensated Pulmonary state. Hold Lasix. Hydrate GENTLY.  No NSAIDs or IV dye. D/C prn Ibuprofen. Dose meds for CrCl less than 10 cc/min until ARF/SAMIR is resolved.      2. COVID 19 + PNA-------Blood cx pending. Qualifying for Remdesivir. Per PCP/Pulmonary. On Zinc, Vitamin D, Vitamin B1/B-12/B-6. On Levaquin also     3. ANEMIA-------SPEP pending. H/H stable     4. METABOLIC ALKALOSIS-----Better with holding Lasix and hydrating     5. OA/DJD------No NSAIDs. On Allopurinol     6. BPH AND H/O NEPHROLITHIASIS-------PVR okay     7. H/O CHF/ISCHEMIC CM-------Currently without gross overload clinically despite elevated BNP. Hold Lasix and hydrate GENTLY for reasons stated above     8. DMII-------BS okay. Glucometers, SSI     9. GERD/PUD PROPHYLAXIS------Renal dose adjusted Pepcid     10. HYPERLIPIDEMIA-----On Statin. Check CK, TSH     11. ELEVATED D-DIMER/DVT PROPHYLAXIS-----SCD b/c of thrombocytopenia (Per PCP)     12.  THROMBOCYTOPENIA-----SPEP pending    13. HYPOCALCEMIA------Replaced    14. HYPOKALEMIA------Replaced        Anastacia Gallegos MD  [facility]  [phone]  01/17/21  08:25 EST

## 2021-01-17 NOTE — PROGRESS NOTES
Discharge Planning Assessment   Rell     Patient Name: Moe Lora  MRN: [id number]  Today's Date: 1/17/2021    Admit Date: 1/14/2021    Discharge Needs Assessment     Row Name 01/17/21 1029       Discharge Needs Assessment    Equipment Currently Used at Home  -- He uses a cane , has BIPAP but doesn't use , he said it doesn't work and he has tried to get it [facility] or [facility] , but hasn't been able to get it done ,so far. He has a RWX ,but doesn't need to use.    Concerns to be Addressed  denies needs/concerns at this time    Row Name 01/17/21 1025       Living Environment    Lives With  spouse;child(mary), adult Pt states he does the cooking , spouse does the cleaning and the two 40 something adult kids watch.    Primary Care Provided by  self    Provides Primary Care For  no one    Family Caregiver if Needed  spouse       Transition Planning    Transportation Anticipated  car, drives self       Discharge Needs Assessment    Equipment Currently Used at Home  bipap;cane, straight;walker, rolling        Discharge Plan     Row Name 01/17/21 1015       Plan    Plan  Anticipate routine home with spouse.    Plan Comments  I spoke with pt - he lives with spouse - said he is active and does all the cooking at home . He drives , uses a cane , has BIPAP but doesn't use , he said it doesn't work and he has tried to get it [facility] or [facility] , but hasn't been able to get it done ,so far. He has a RWX ,but doesn't need to use.

## 2021-01-18 ENCOUNTER — APPOINTMENT (OUTPATIENT)
Dept: GENERAL RADIOLOGY | Facility: HOSPITAL | Age: 76
End: 2021-01-18

## 2021-01-18 LAB
ALBUMIN SERPL ELPH-MCNC: 3.4 G/DL (ref 2.9–4.4)
ALBUMIN SERPL-MCNC: 3.5 G/DL (ref 3.5–5.2)
ALBUMIN/GLOB SERPL: 1.1 {RATIO} (ref 0.7–1.7)
ALBUMIN/GLOB SERPL: 1.7 G/DL
ALP SERPL-CCNC: 240 U/L (ref 39–117)
ALPHA1 GLOB SERPL ELPH-MCNC: 0.3 G/DL (ref 0–0.4)
ALPHA2 GLOB SERPL ELPH-MCNC: 0.9 G/DL (ref 0.4–1)
ALT SERPL W P-5'-P-CCNC: 24 U/L (ref 1–41)
ANION GAP SERPL CALCULATED.3IONS-SCNC: 11 MMOL/L (ref 5–15)
ARTERIAL PATENCY WRIST A: POSITIVE
AST SERPL-CCNC: 48 U/L (ref 1–40)
ATMOSPHERIC PRESS: ABNORMAL MM[HG]
B-GLOBULIN SERPL ELPH-MCNC: 0.9 G/DL (ref 0.7–1.3)
BASE EXCESS BLDA CALC-SCNC: -1.8 MMOL/L (ref 0–3)
BASOPHILS # BLD AUTO: 0 10*3/MM3 (ref 0–0.2)
BASOPHILS NFR BLD AUTO: 0.1 % (ref 0–1.5)
BDY SITE: ABNORMAL
BILIRUB SERPL-MCNC: 0.7 MG/DL (ref 0–1.2)
BUN SERPL-MCNC: 38 MG/DL (ref 8–23)
BUN/CREAT SERPL: 27.9 (ref 7–25)
CALCIUM SPEC-SCNC: 9.2 MG/DL (ref 8.6–10.5)
CHLORIDE SERPL-SCNC: 104 MMOL/L (ref 98–107)
CK SERPL-CCNC: 96 U/L (ref 20–200)
CO2 BLDA-SCNC: 23.1 MMOL/L (ref 22–29)
CO2 SERPL-SCNC: 23 MMOL/L (ref 22–29)
CREAT SERPL-MCNC: 1.36 MG/DL (ref 0.76–1.27)
CRP SERPL-MCNC: 3.97 MG/DL (ref 0–0.5)
D DIMER PPP FEU-MCNC: 0.64 MG/L (FEU) (ref 0–0.59)
DEPRECATED RDW RBC AUTO: 52.9 FL (ref 37–54)
EOSINOPHIL # BLD AUTO: 0 10*3/MM3 (ref 0–0.4)
EOSINOPHIL NFR BLD AUTO: 0 % (ref 0.3–6.2)
ERYTHROCYTE [DISTWIDTH] IN BLOOD BY AUTOMATED COUNT: 17.3 % (ref 12.3–15.4)
FERRITIN SERPL-MCNC: 2281 NG/ML (ref 30–400)
FIBRINOGEN PPP-MCNC: 371 MG/DL (ref 210–450)
GAMMA GLOB SERPL ELPH-MCNC: 0.9 G/DL (ref 0.4–1.8)
GFR SERPL CREATININE-BSD FRML MDRD: 51 ML/MIN/1.73
GLOBULIN SER CALC-MCNC: 3 G/DL (ref 2.2–3.9)
GLOBULIN UR ELPH-MCNC: 2.1 GM/DL
GLUCOSE BLDC GLUCOMTR-MCNC: 122 MG/DL (ref 70–105)
GLUCOSE BLDC GLUCOMTR-MCNC: 128 MG/DL (ref 70–105)
GLUCOSE BLDC GLUCOMTR-MCNC: 183 MG/DL (ref 70–105)
GLUCOSE BLDC GLUCOMTR-MCNC: 279 MG/DL (ref 70–105)
GLUCOSE SERPL-MCNC: 132 MG/DL (ref 65–99)
HCO3 BLDA-SCNC: 22.1 MMOL/L (ref 21–28)
HCT VFR BLD AUTO: 35.1 % (ref 37.5–51)
HEMODILUTION: NO
HGB BLD-MCNC: 11.9 G/DL (ref 13–17.7)
INHALED O2 CONCENTRATION: 21 %
LABORATORY COMMENT REPORT: NORMAL
LDH SERPL-CCNC: 584 U/L (ref 135–225)
LYMPHOCYTES # BLD AUTO: 0.3 10*3/MM3 (ref 0.7–3.1)
LYMPHOCYTES NFR BLD AUTO: 3.8 % (ref 19.6–45.3)
M PROTEIN SERPL ELPH-MCNC: NORMAL G/DL
MAGNESIUM SERPL-MCNC: 2.3 MG/DL (ref 1.6–2.4)
MCH RBC QN AUTO: 29.7 PG (ref 26.6–33)
MCHC RBC AUTO-ENTMCNC: 33.8 G/DL (ref 31.5–35.7)
MCV RBC AUTO: 87.9 FL (ref 79–97)
MODALITY: ABNORMAL
MONOCYTES # BLD AUTO: 0.3 10*3/MM3 (ref 0.1–0.9)
MONOCYTES NFR BLD AUTO: 3.8 % (ref 5–12)
NEUTROPHILS NFR BLD AUTO: 6.8 10*3/MM3 (ref 1.7–7)
NEUTROPHILS NFR BLD AUTO: 92.3 % (ref 42.7–76)
NRBC BLD AUTO-RTO: 0 /100 WBC (ref 0–0.2)
PCO2 BLDA: 33.5 MM HG (ref 35–48)
PH BLDA: 7.43 PH UNITS (ref 7.35–7.45)
PHOSPHATE SERPL-MCNC: 2.2 MG/DL (ref 2.5–4.5)
PLATELET # BLD AUTO: 92 10*3/MM3 (ref 140–450)
PMV BLD AUTO: 9.5 FL (ref 6–12)
PO2 BLDA: 48.7 MM HG (ref 83–108)
POTASSIUM SERPL-SCNC: 3.6 MMOL/L (ref 3.5–5.2)
PROT PATTERN SERPL ELPH-IMP: NORMAL
PROT SERPL-MCNC: 5.6 G/DL (ref 6–8.5)
PROT SERPL-MCNC: 6.4 G/DL (ref 6–8.5)
RBC # BLD AUTO: 4 10*6/MM3 (ref 4.14–5.8)
SAO2 % BLDCOA: 85.4 % (ref 94–98)
SODIUM SERPL-SCNC: 138 MMOL/L (ref 136–145)
WBC # BLD AUTO: 7.4 10*3/MM3 (ref 3.4–10.8)

## 2021-01-18 PROCEDURE — 83615 LACTATE (LD) (LDH) ENZYME: CPT | Performed by: INTERNAL MEDICINE

## 2021-01-18 PROCEDURE — 85025 COMPLETE CBC W/AUTO DIFF WBC: CPT | Performed by: INTERNAL MEDICINE

## 2021-01-18 PROCEDURE — 85379 FIBRIN DEGRADATION QUANT: CPT | Performed by: INTERNAL MEDICINE

## 2021-01-18 PROCEDURE — 63710000001 INSULIN LISPRO (HUMAN) PER 5 UNITS: Performed by: INTERNAL MEDICINE

## 2021-01-18 PROCEDURE — 86140 C-REACTIVE PROTEIN: CPT | Performed by: INTERNAL MEDICINE

## 2021-01-18 PROCEDURE — 80053 COMPREHEN METABOLIC PANEL: CPT | Performed by: INTERNAL MEDICINE

## 2021-01-18 PROCEDURE — 83735 ASSAY OF MAGNESIUM: CPT | Performed by: INTERNAL MEDICINE

## 2021-01-18 PROCEDURE — 63710000001 INSULIN GLARGINE PER 5 UNITS: Performed by: INTERNAL MEDICINE

## 2021-01-18 PROCEDURE — 84100 ASSAY OF PHOSPHORUS: CPT | Performed by: INTERNAL MEDICINE

## 2021-01-18 PROCEDURE — 25010000002 HEPARIN (PORCINE) PER 1000 UNITS: Performed by: INTERNAL MEDICINE

## 2021-01-18 PROCEDURE — 85384 FIBRINOGEN ACTIVITY: CPT | Performed by: INTERNAL MEDICINE

## 2021-01-18 PROCEDURE — 82550 ASSAY OF CK (CPK): CPT | Performed by: INTERNAL MEDICINE

## 2021-01-18 PROCEDURE — 94618 PULMONARY STRESS TESTING: CPT

## 2021-01-18 PROCEDURE — 82803 BLOOD GASES ANY COMBINATION: CPT

## 2021-01-18 PROCEDURE — 82728 ASSAY OF FERRITIN: CPT | Performed by: INTERNAL MEDICINE

## 2021-01-18 PROCEDURE — 71045 X-RAY EXAM CHEST 1 VIEW: CPT

## 2021-01-18 PROCEDURE — 99231 SBSQ HOSP IP/OBS SF/LOW 25: CPT | Performed by: HOSPITALIST

## 2021-01-18 PROCEDURE — 36600 WITHDRAWAL OF ARTERIAL BLOOD: CPT

## 2021-01-18 PROCEDURE — 82962 GLUCOSE BLOOD TEST: CPT

## 2021-01-18 RX ORDER — ALLOPURINOL 100 MG/1
100 TABLET ORAL DAILY
Status: DISCONTINUED | OUTPATIENT
Start: 2021-01-19 | End: 2021-01-19 | Stop reason: HOSPADM

## 2021-01-18 RX ORDER — AMOXICILLIN 250 MG
2 CAPSULE ORAL NIGHTLY
Status: DISCONTINUED | OUTPATIENT
Start: 2021-01-18 | End: 2021-01-18

## 2021-01-18 RX ORDER — POLYETHYLENE GLYCOL 3350 17 G/17G
17 POWDER, FOR SOLUTION ORAL DAILY
Status: DISCONTINUED | OUTPATIENT
Start: 2021-01-18 | End: 2021-01-19 | Stop reason: HOSPADM

## 2021-01-18 RX ADMIN — HEPARIN SODIUM 7500 UNITS: 5000 INJECTION INTRAVENOUS; SUBCUTANEOUS at 09:24

## 2021-01-18 RX ADMIN — HEPARIN SODIUM 7500 UNITS: 5000 INJECTION INTRAVENOUS; SUBCUTANEOUS at 21:04

## 2021-01-18 RX ADMIN — INSULIN LISPRO 6 UNITS: 100 INJECTION, SOLUTION INTRAVENOUS; SUBCUTANEOUS at 21:08

## 2021-01-18 RX ADMIN — ASPIRIN 81 MG: 81 TABLET, CHEWABLE ORAL at 09:21

## 2021-01-18 RX ADMIN — CARVEDILOL 12.5 MG: 6.25 TABLET, FILM COATED ORAL at 17:10

## 2021-01-18 RX ADMIN — ISOSORBIDE MONONITRATE 30 MG: 30 TABLET, EXTENDED RELEASE ORAL at 09:19

## 2021-01-18 RX ADMIN — ZINC SULFATE 220 MG (50 MG) CAPSULE 440 MG: CAPSULE at 09:23

## 2021-01-18 RX ADMIN — ALLOPURINOL 300 MG: 300 TABLET ORAL at 09:18

## 2021-01-18 RX ADMIN — Medication 10 ML: at 09:24

## 2021-01-18 RX ADMIN — FAMOTIDINE 20 MG: 20 TABLET, FILM COATED ORAL at 09:19

## 2021-01-18 RX ADMIN — INSULIN LISPRO 2 UNITS: 100 INJECTION, SOLUTION INTRAVENOUS; SUBCUTANEOUS at 17:10

## 2021-01-18 RX ADMIN — Medication 1000 UNITS: at 09:22

## 2021-01-18 RX ADMIN — OXYCODONE HYDROCHLORIDE AND ACETAMINOPHEN 500 MG: 500 TABLET ORAL at 23:38

## 2021-01-18 RX ADMIN — OXYCODONE HYDROCHLORIDE AND ACETAMINOPHEN 500 MG: 500 TABLET ORAL at 11:38

## 2021-01-18 RX ADMIN — Medication 100 MG: at 09:22

## 2021-01-18 RX ADMIN — DEXAMETHASONE 6 MG: 4 TABLET ORAL at 09:20

## 2021-01-18 RX ADMIN — SODIUM CHLORIDE 100 MG: 9 INJECTION, SOLUTION INTRAVENOUS at 17:12

## 2021-01-18 RX ADMIN — DOXYCYCLINE 100 MG: 100 TABLET, FILM COATED ORAL at 09:20

## 2021-01-18 RX ADMIN — DOXYCYCLINE 100 MG: 100 TABLET, FILM COATED ORAL at 21:04

## 2021-01-18 RX ADMIN — Medication 100 MG: at 09:19

## 2021-01-18 RX ADMIN — ATORVASTATIN CALCIUM 40 MG: 40 TABLET, FILM COATED ORAL at 21:04

## 2021-01-18 RX ADMIN — INSULIN GLARGINE 10 UNITS: 100 INJECTION, SOLUTION SUBCUTANEOUS at 21:15

## 2021-01-18 RX ADMIN — OXYCODONE HYDROCHLORIDE AND ACETAMINOPHEN 500 MG: 500 TABLET ORAL at 05:36

## 2021-01-18 RX ADMIN — OXYCODONE HYDROCHLORIDE AND ACETAMINOPHEN 500 MG: 500 TABLET ORAL at 17:11

## 2021-01-18 RX ADMIN — CYANOCOBALAMIN TAB 1000 MCG 1000 MCG: 1000 TAB at 09:21

## 2021-01-18 RX ADMIN — Medication 10 ML: at 21:28

## 2021-01-18 RX ADMIN — CARVEDILOL 12.5 MG: 6.25 TABLET, FILM COATED ORAL at 09:23

## 2021-01-18 NOTE — PROGRESS NOTES
"      [facility] Medicine Services Daily Progress Note      Hospitalist Team  LOS 0 days      Patient Care Team:  Provider, No Known as PCP - Felicitas Poole MD as Consulting Physician (Nephrology)    Patient Location: 205/1      Subjective   Subjective     Chief Complaint / Subjective  Chief Complaint   Patient presents with   • Exposure To Known Illness         Brief Synopsis of Hospital Course/HPI    75-year-old  male with history of countless medical problems as outlined below, presented to the ED complaining of increasing shortness of breath over the past several days.  States shortness of breath is much worse on exertion.  Patient was diagnosed with Covid symptomatic over the past 7 to 8 days.  He also has productive cough with \"nasty phlegm \", fever, sweating, and nausea without vomiting.  He denies other complaint.     Emergency department course:  Febrile with initial temperature 103.1, hemodynamically stable, no acute distress.  Physical examination per ED physician was significant for presence of rhonchi at the bases.  ABG analysis on FiO2 32% showed a pH of 7.549, PCO2 35, PO2 68.5 and O2 saturation 95.6.  Labs were significant for blood glucose level 189, creatinine 1.79, BUN 36, and GFR 37.  WBC count 6.9 with neutrophils 94.1% and lymphocyte 1.7%.  Lactate level was normal at 1.9.  Chest x-ray reported \". There are bilateral airspace opacities which are consistent with pneumonia and/or pulmonary edema. 2. Cardiomegaly\".  Patient was given 1000 mg Tylenol, 800 mg ibuprofen, 750 mg IV levofloxacin and placed on supplemental O2 with 2 L nasal cannula.    Date::    1/15/2021 Pt wanting to leave AMA, was convinced to stay given severity of illness  1/16/2021 Pt feeling a little better, having some loose stools  1/17/2021 Pt feeling better, still coughing  And diarrhea, but less than before.      Review of Systems   Constitution: Positive for fever and night " "sweats. Negative for chills.   Cardiovascular: Positive for dyspnea on exertion.   Respiratory: Positive for cough and sputum production.    Gastrointestinal: Positive for nausea. Negative for vomiting.   All other systems reviewed and are negative.        Objective   Objective      Vital Signs  Temp:  [95.4 °F (35.2 °C)-97.2 °F (36.2 °C)] 95.4 °F (35.2 °C)  Heart Rate:  [] 84  Resp:  [14-20] 17  BP: (107-138)/(65-86) 123/75  Oxygen Therapy  SpO2: 92 %  Pulse Oximetry Type: Continuous  Device (Oxygen Therapy): nasal cannula  Flow (L/min): 3  Flowsheet Rows      First Filed Value   Admission Height  137.2 cm (54\") Documented at 01/14/2021 1813   Admission Weight  79.4 kg (175 lb) Documented at 01/14/2021 1813        Intake & Output (last 3 days)       01/15 0701 - 01/16 0700 01/16 0701 - 01/17 0700 01/17 0701 - 01/18 0700    P.O. 300 720 720    I.V. (mL/kg)  1528 (18.4) 480 (5.3)    IV Piggyback  150     Total Intake(mL/kg) 300 (3.6) 2398 (28.8) 1200 (13.1)    Urine (mL/kg/hr) 250 (0.1) 1150 (0.6) 2400 (1.1)    Stool 0 0 0    Total Output 250 1150 2400    Net +50 +1248 -1200           Stool Unmeasured Occurrence 3 x 1 x 1 x        Lines, Drains & Airways    Active LDAs     Name:   Placement date:   Placement time:   Site:   Days:    Peripheral IV 01/14/21 1824 Left Antecubital   01/14/21 1824    Antecubital   1                  Physical Exam:    Physical Exam    General: well-developed and well-nourished, NAD  HEENT: NC/AT, EOMI, PERRLA  Heart: RRR. No murmur   Chest: CTAB, no w/r/r, normal respiratory effort  Abdominal: Soft. NT/ND. Bowel sounds present  Musculoskeletal: Normal ROM.  No edema. No calf tenderness.  Neurological: AAOx3, no focal deficits  Skin: Skin is warm and dry. No rash  Psychiatric: Normal mood and affect.        Procedures:              Results Review:     I reviewed the patient's new clinical results.      COVID-19 LAB PANEL     COVID-19 test result  COVID19   Date Value Ref Range " Status   01/15/2021 Detected (C) Not Detected - Ref. Range Final       COVID-19 Prognostic lab results  WBC with differential  Results from last 7 days   Lab Units 01/17/21  0346 01/16/21  0350 01/14/21  1825   WBC 10*3/mm3 4.00 6.70 6.90   PLATELETS 10*3/mm3 118* 119* 128*   NEUTROPHIL % % 90.5* 95.5* 94.1*   LYMPHOCYTE % % 4.9* 2.4* 1.7*   NEUTROS ABS 10*3/mm3 3.60 6.40 6.50   LYMPHS ABS 10*3/mm3 0.20* 0.20* 0.10*     Inflammatory markers  Results from last 7 days   Lab Units 01/17/21  0346 01/16/21  0350 01/15/21  0038 01/14/21  1825   CRP mg/dL 8.03* 12.10* 4.42* 3.01*   FERRITIN ng/mL 3,769.00* 3,595.00* 2,056.00*  --    CK TOTAL U/L 232* 136 52  --    D DIMER QUANT mg/L (FEU)  --  0.94*  --  1.01*   PROCALCITONIN ng/mL  --   --   --  0.29*   LDH U/L  --  558* 413*  --    ALK PHOS U/L 266* 289* 278*  --    AST (SGOT) U/L 45* 34 26  --    ALT (SGPT) U/L 20 19 19  --        Poor COVID-19 outcomes associated with:  Neutrophil:Lymphocyte ratio >3.5  Thrombocytopenia, lymphopenia  LFT's >5x upper limit of normal  LDH >400      Lab Results (last 24 hours)     Procedure Component Value Units Date/Time    POC Glucose Once [[id number]]  (Abnormal) Collected: 01/17/21 2015    Specimen: Blood Updated: 01/17/21 2017     Glucose 233 mg/dL      Comment: Serial Number: [id number]:  [id number]       Respiratory Culture - Sputum, Cough [[id number]] Collected: 01/17/21 1731    Specimen: Sputum from Cough Updated: 01/17/21 1849    Blood Culture - Blood, Arm, Right [[id number]] Collected: 01/14/21 1827    Specimen: Blood from Arm, Right Updated: 01/17/21 1845     Blood Culture No growth at 3 days    Blood Culture - Blood, Arm, Left [[id number]] Collected: 01/14/21 1827    Specimen: Blood from Arm, Left Updated: 01/17/21 1845     Blood Culture No growth at 3 days    POC Glucose Once [[id number]]  (Abnormal) Collected: 01/17/21 1654    Specimen: Blood Updated: 01/17/21 1657     Glucose 156 mg/dL      Comment: Serial Number:  [id number]:  [id number]       Blood Culture - Blood, Arm, Left [[id number]] Collected: 01/15/21 1242    Specimen: Blood from Arm, Left Updated: 01/17/21 1302     Blood Culture No growth at 2 days    POC Glucose Once [[id number]]  (Abnormal) Collected: 01/17/21 1107    Specimen: Blood Updated: 01/17/21 1109     Glucose 210 mg/dL      Comment: Serial Number: [id number]:  [id number]       Ferritin [[id number]]  (Abnormal) Collected: 01/17/21 0346    Specimen: Blood Updated: 01/17/21 0847     Ferritin 3,769.00 ng/mL     Narrative:      Results may be falsely decreased if patient taking Biotin.      POC Glucose Once [[id number]]  (Abnormal) Collected: 01/17/21 0724    Specimen: Blood Updated: 01/17/21 0726     Glucose 172 mg/dL      Comment: Serial Number: [id number]:  [id number]       Phosphorus [[id number]]  (Normal) Collected: 01/17/21 0346    Specimen: Blood Updated: 01/17/21 0622     Phosphorus 3.0 mg/dL         No results found for: HGBA1C        Results from last 7 days   Lab Units 01/14/21  1858   PH, ARTERIAL pH units 7.549*   PO2 ART mm Hg 68.5*   PCO2, ARTERIAL mm Hg 35.0   HCO3 ART mmol/L 30.6*     No results found for: LIPASE  Lab Results   Component Value Date    CHLPL 95 02/17/2018    TRIG 176 (H) 02/17/2018    HDL 18 (L) 02/17/2018    LDL 42 02/17/2018       Lab Results   Lab Value Date/Time    FINALDX  08/02/2018 1140     \"LEFT OPTHALMIC SOCKET\":        BENIGN FIBROUS TISSUE/SCAR.        FOREIGN BODY MATERIAL (GROSS ONLY) - CONSISTENT WITH HYDROXYAPATITE.        CPT: 78949         Microbiology Results (last 10 days)     Procedure Component Value - Date/Time    Clostridium Difficile EIA - Stool, Per Rectum [[id number]]  (Normal) Collected: 01/16/21 1246    Lab Status: Final result Specimen: Stool from Per Rectum Updated: 01/16/21 1332     C Diff GDH / Toxin Negative    Legionella Antigen, Urine - Urine, Urine, Clean Catch [[id number]]  (Normal) Collected: 01/16/21 1245    Lab Status: Final " result Specimen: Urine, Clean Catch Updated: 01/16/21 1348     LEGIONELLA ANTIGEN, URINE Negative    S. Pneumo Ag Urine or CSF - Urine, Urine, Clean Catch [[id number]]  (Normal) Collected: 01/16/21 1245    Lab Status: Final result Specimen: Urine, Clean Catch Updated: 01/16/21 1348     Strep Pneumo Ag Negative    Eosinophil Smear - Urine, Urine, Clean Catch [[id number]]  (Normal) Collected: 01/16/21 1245    Lab Status: Final result Specimen: Urine, Clean Catch Updated: 01/16/21 1410     Eosinophil Smear 0 % EOS/100 Cells     Blood Culture - Blood, Arm, Left [[id number]] Collected: 01/15/21 1242    Lab Status: Preliminary result Specimen: Blood from Arm, Left Updated: 01/17/21 1302     Blood Culture No growth at 2 days    Respiratory Panel PCR w/COVID-19(SARS-CoV-2) SIMRAN/KEYSHAWN/SKYLA/PAD/COR/MAD/SUDHIR In-House, NP Swab in UTM/VTM, 3-4 HR TAT - Swab, Nasopharynx [[id number]]  (Abnormal) Collected: 01/15/21 1206    Lab Status: Final result Specimen: Swab from Nasopharynx Updated: 01/15/21 1316     ADENOVIRUS, PCR Not Detected     Coronavirus 229E Not Detected     Coronavirus HKU1 Not Detected     Coronavirus NL63 Not Detected     Coronavirus OC43 Not Detected     COVID19 Detected     Human Metapneumovirus Not Detected     Human Rhinovirus/Enterovirus Not Detected     Influenza A PCR Not Detected     Influenza B PCR Not Detected     Parainfluenza Virus 1 Not Detected     Parainfluenza Virus 2 Not Detected     Parainfluenza Virus 3 Not Detected     Parainfluenza Virus 4 Not Detected     RSV, PCR Not Detected     Bordetella pertussis pcr Not Detected     Bordetella parapertussis PCR Not Detected     Chlamydophila pneumoniae PCR Not Detected     Mycoplasma pneumo by PCR Not Detected    Narrative:      Fact sheet for providers: https://docs.[employer]/wp-content/uploads/NBC5296-4156-LH1.1-EUA-Provider-Fact-Sheet-3.pdf    Fact sheet for patients:  https://docs.[employer]/wp-content/uploads/KSB5959-5599-QM4.1-EUA-Patient-Fact-Sheet-1.pdf    Test performed by PCR.    Blood Culture - Blood, Arm, Right [[id number]] Collected: 01/14/21 1827    Lab Status: Preliminary result Specimen: Blood from Arm, Right Updated: 01/17/21 1845     Blood Culture No growth at 3 days    Blood Culture - Blood, Arm, Left [[id number]] Collected: 01/14/21 1827    Lab Status: Preliminary result Specimen: Blood from Arm, Left Updated: 01/17/21 1845     Blood Culture No growth at 3 days          ECG/EMG Results (most recent)     Procedure Component Value Units Date/Time    ECG 12 Lead [[id number]] Collected: 01/14/21 2155     Updated: 01/15/21 1738     QT Interval 427 ms     Narrative:      HEART RATE= 90  bpm  RR Interval= 667  ms  OK Interval=   ms  P Horizontal Axis=   deg  P Front Axis= 0  deg  QRSD Interval= 134  ms  QT Interval= 427  ms  QRS Axis= -45  deg  T Wave Axis= 157  deg  - ABNORMAL ECG -  Ventricular-paced rhythm  Biventricular paced rhythm  When compared with ECG of 25-Jul-2018 8:56:31,  Significant change in rhythm  Electronically Signed By: Heber Garduno ([facility]) 15-Albert-2021 17:36:17  Date and Time of Study: 2021-01-14 21:55:52                    Xr Chest 1 View    Result Date: 1/14/2021  1. There are bilateral airspace opacities which are consistent with pneumonia and/or pulmonary edema. 2. Cardiomegaly.  Electronically Signed By-Kristi Menon MD On:1/14/2021 7:01 PM This report was finalized on [id number] by  Kristi Menon MD.    Us Renal Bilateral    Result Date: 1/15/2021  1. Negative for hydronephrosis. 2. There is moderate right renal cortical thinning. There is mild left renal cortical thinning. 2. Small incidental renal cysts.  Electronically Signed By-Kristi Menon MD On:1/15/2021 10:18 PM This report was finalized on [id number] by  Kristi Menon MD.          Xrays, labs reviewed personally by physician.    Medication Review:   I have reviewed the patient's  current medication list      Scheduled Meds  allopurinol, 300 mg, Oral, Daily  ascorbic acid, 500 mg, Oral, Q6H  aspirin, 81 mg, Oral, Daily  atorvastatin, 40 mg, Oral, Nightly  carvedilol, 12.5 mg, Oral, Daily With Breakfast & Dinner  cholecalciferol, 1,000 Units, Oral, Daily  dexamethasone, 6 mg, Oral, Daily With Breakfast  doxycycline, 100 mg, Oral, Q12H  famotidine, 20 mg, Oral, Daily  heparin (porcine), 7,500 Units, Subcutaneous, Q12H  insulin glargine, 10 Units, Subcutaneous, Nightly  insulin lispro, 0-9 Units, Subcutaneous, TID AC  isosorbide mononitrate, 30 mg, Oral, Daily  levoFLOXacin, 750 mg, Intravenous, Q48H  remdesivir, 100 mg, Intravenous, Q24H  sodium chloride, 10 mL, Intravenous, Q12H  thiamine, 100 mg, Oral, Daily  vitamin B-12, 1,000 mcg, Oral, Daily  vitamin B-6, 100 mg, Oral, Daily  zinc sulfate, 440 mg, Oral, Daily        Meds Infusions  Pharmacy Consult - Remdesivir,   Pharmacy Consult - Steroid Insulin Protocol,   Pharmacy to Dose Heparin,   sodium chloride, 60 mL/hr, Last Rate: Stopped (01/16/21 2154)        Meds PRN  •  acetaminophen  •  dextrose  •  dextrose  •  glucagon (human recombinant)  •  guaiFENesin-dextromethorphan  •  HYDROcodone-acetaminophen  •  insulin lispro **AND** insulin lispro  •  loperamide  •  LORazepam **OR** LORazepam **OR** LORazepam **OR** LORazepam **OR** LORazepam **OR** LORazepam  •  ondansetron  •  Pharmacy Consult - Remdesivir  •  Pharmacy Consult - Steroid Insulin Protocol  •  Pharmacy to Dose Heparin  •  potassium chloride  •  potassium chloride  •  [COMPLETED] Insert peripheral IV **AND** sodium chloride  •  sodium chloride        Assessment/Plan   Assessment/Plan     Active Hospital Problems    Diagnosis  POA   • CHF (congestive heart failure) (CMS/[facility]) [I50.9]  Unknown   • AF (atrial fibrillation) (CMS/[facility]) [I48.91]  Unknown   • AICD (automatic cardioverter/defibrillator) present [Z95.810]  Unknown   • MI (myocardial infarction) (CMS/[facility]) [I21.9]  Unknown    • Stroke (CMS/[facility]) [I63.9]  Unknown   • Pneumonia due to COVID-19 virus [U07.1, J12.82]  Unknown   • Sepsis (CMS/[facility]) [A41.9]  Yes   • Acute kidney injury superimposed on CKD (CMS/[facility]) [N17.9, N18.9]  Yes      Resolved Hospital Problems   No resolved problems to display.       MEDICAL DECISION MAKING COMPLEXITY BY PROBLEM:     COVID-19 infection with pneumonia  -- 01/16/21 -- COVID-19 test positive  -- supplemental O2 to keep sats >93% ; currently on   -- allow permissive hypoxia to sats >86%  -- pronate patient as tolerated for better oxygenation  -- anti-inflammatory supplements/treatments (per [facility] COVID-19 protocol)     -- Vitamin C 500mg PO q6h     -- Vitamin D3 20,000-60,000 iu x1 (200-500 mcg) then 20,000 iu (200 mcg) weekly     -- Zinc gluconate 100 mg Daily (zinc sulfate 440 mg)     -- Melatonin 10mg nightly     -- B complex vitamins (B12, B6)     -- Famotidine 40mg/day     -- Enoxaparin as per pharmacy protocol     -- Steroids: Dexamethasone     -- Remdesivir: 5-day course initiated 1/15/2021 through 1/19/2021     -- Ivermectin 12 mg on 1/15/2021, 15 mg on 1/17/2021  -- empiric antibiotics: Doxycycline and Levaquin  -- admission labs: PCT, CRP, IL-6, BNP, Trop, Ferritin, D-dimer, Mg, Neutrophil/Lymphocyte ratio  -- monitor daily labs     -- CMP, CBC, CRP, Ferritin, PCT, D-dimer  -- continue supportive care as needed: antipyretics, breathing treatments, mucolytics, etc.    SAMIR on CKD stage 3b  -- hold lisinopril and diuretics  -- Consult Nephrology  -- continue to monitor BMP    Diabetes mellitus type 2  -- poorly controlled  -- SSI coverage  -- [employer] AC/HS    Remaining chronic conditions as per H&P with home medications for management.    VTE Prophylaxis -   Mechanical Order History:      Ordered        01/15/21 1257  Place Sequential Compression Device  Once         01/15/21 1257  Maintain Sequential Compression Device  Continuous                 Pharmalogical Order History:      Ordered     Dose  Route Frequency Stop    01/15/21 0027  dabigatran etexilate (PRADAXA) capsule 150 mg  Status:  Discontinued      150 mg PO 2 Times Daily 01/15/21 1109                  Code Status -   Code Status and Medical Interventions:   Ordered at: 01/14/21 2124     Code Status:    CPR     Medical Interventions (Level of Support Prior to Arrest):    Full       This patient has been examined wearing appropriate Personal Protective Equipment. 01/18/21        Discharge Planning    Pending clinical course        Electronically signed by Tiffany Guardado MD, 01/18/21, 06:01 EST.  [facility] Hospitalist Team

## 2021-01-18 NOTE — PLAN OF CARE
Goal Outcome Evaluation:  Plan of Care Reviewed With: patient  Progress: improving  Outcome Summary: pt rested comfortably throughout shift, pt denied any complaints, will continue to monitor.

## 2021-01-18 NOTE — PROGRESS NOTES
"      [facility] Medicine Services Daily Progress Note      Hospitalist Team  LOS 0 days      Patient Care Team:  Provider, No Known as PCP - Felicitas Poole MD as Consulting Physician (Nephrology)    Patient Location: 205/1      Subjective   Subjective     Chief Complaint / Subjective  Chief Complaint   Patient presents with   • Exposure To Known Illness         Brief Synopsis of Hospital Course/HPI    75-year-old  male with history of countless medical problems as outlined below, presented to the ED complaining of increasing shortness of breath over the past several days.  States shortness of breath is much worse on exertion.  Patient was diagnosed with Covid symptomatic over the past 7 to 8 days.  He also has productive cough with \"nasty phlegm \", fever, sweating, and nausea without vomiting.  He denies other complaint.     Emergency department course:  Febrile with initial temperature 103.1, hemodynamically stable, no acute distress.  Physical examination per ED physician was significant for presence of rhonchi at the bases.  ABG analysis on FiO2 32% showed a pH of 7.549, PCO2 35, PO2 68.5 and O2 saturation 95.6.  Labs were significant for blood glucose level 189, creatinine 1.79, BUN 36, and GFR 37.  WBC count 6.9 with neutrophils 94.1% and lymphocyte 1.7%.  Lactate level was normal at 1.9.  Chest x-ray reported \". There are bilateral airspace opacities which are consistent with pneumonia and/or pulmonary edema. 2. Cardiomegaly\".  Patient was given 1000 mg Tylenol, 800 mg ibuprofen, 750 mg IV levofloxacin and placed on supplemental O2 with 2 L nasal cannula.    Date::    1/15/2021 Pt wanting to leave A, was convinced to stay given severity of illness  1/16/2021 Pt feeling a little better, having some loose stools      Review of Systems   Constitution: Positive for fever and night sweats. Negative for chills.   Cardiovascular: Positive for dyspnea on exertion. " "  Respiratory: Positive for cough and sputum production.    Gastrointestinal: Positive for nausea. Negative for vomiting.   All other systems reviewed and are negative.        Objective   Objective      Vital Signs  Temp:  [95.4 °F (35.2 °C)-97.2 °F (36.2 °C)] 95.4 °F (35.2 °C)  Heart Rate:  [] 84  Resp:  [14-20] 17  BP: (107-138)/(65-86) 123/75  Oxygen Therapy  SpO2: 92 %  Pulse Oximetry Type: Continuous  Device (Oxygen Therapy): nasal cannula  Flow (L/min): 3  Flowsheet Rows      First Filed Value   Admission Height  137.2 cm (54\") Documented at 01/14/2021 1813   Admission Weight  79.4 kg (175 lb) Documented at 01/14/2021 1813        Intake & Output (last 3 days)       01/15 0701 - 01/16 0700 01/16 0701 - 01/17 0700 01/17 0701 - 01/18 0700    P.O. 300 720 720    I.V. (mL/kg)  1528 (18.4) 480 (5.3)    IV Piggyback  150     Total Intake(mL/kg) 300 (3.6) 2398 (28.8) 1200 (13.1)    Urine (mL/kg/hr) 250 (0.1) 1150 (0.6) 2400 (1.1)    Stool 0 0 0    Total Output 250 1150 2400    Net +50 +1248 -1200           Stool Unmeasured Occurrence 3 x 1 x 1 x        Lines, Drains & Airways    Active LDAs     Name:   Placement date:   Placement time:   Site:   Days:    Peripheral IV 01/14/21 1824 Left Antecubital   01/14/21 1824    Antecubital   1                  Physical Exam:    Physical Exam    General: well-developed and well-nourished, NAD  HEENT: NC/AT, EOMI, PERRLA  Heart: RRR. No murmur   Chest: CTAB, no w/r/r, normal respiratory effort  Abdominal: Soft. NT/ND. Bowel sounds present  Musculoskeletal: Normal ROM.  No edema. No calf tenderness.  Neurological: AAOx3, no focal deficits  Skin: Skin is warm and dry. No rash  Psychiatric: Normal mood and affect.        Procedures:              Results Review:     I reviewed the patient's new clinical results.      COVID-19 LAB PANEL     COVID-19 test result  COVID19   Date Value Ref Range Status   01/15/2021 Detected (C) Not Detected - Ref. Range Final       COVID-19 " Prognostic lab results  WBC with differential  Results from last 7 days   Lab Units 01/16/21  0350 01/14/21  1825   WBC 10*3/mm3 6.70 6.90   PLATELETS 10*3/mm3 119* 128*   NEUTROPHIL % % 95.5* 94.1*   LYMPHOCYTE % % 2.4* 1.7*   NEUTROS ABS 10*3/mm3 6.40 6.50   LYMPHS ABS 10*3/mm3 0.20* 0.10*     Inflammatory markers  Results from last 7 days   Lab Units 01/16/21  0350 01/15/21  0038 01/14/21  1825   CRP mg/dL 12.10* 4.42* 3.01*   FERRITIN ng/mL 3,595.00* 2,056.00*  --    CK TOTAL U/L 136 52  --    D DIMER QUANT mg/L (FEU) 0.94*  --  1.01*   PROCALCITONIN ng/mL  --   --  0.29*   LDH U/L 558* 413*  --    ALK PHOS U/L 289* 278*  --    AST (SGOT) U/L 34 26  --    ALT (SGPT) U/L 19 19  --        Poor COVID-19 outcomes associated with:  Neutrophil:Lymphocyte ratio >3.5  Thrombocytopenia, lymphopenia  LFT's >5x upper limit of normal  LDH >400      Lab Results (last 24 hours)     Procedure Component Value Units Date/Time    POC Glucose Once [[id number]]  (Abnormal) Collected: 01/17/21 2015    Specimen: Blood Updated: 01/17/21 2017     Glucose 233 mg/dL      Comment: Serial Number: [id number]:  [id number]       Respiratory Culture - Sputum, Cough [[id number]] Collected: 01/17/21 1731    Specimen: Sputum from Cough Updated: 01/17/21 1849    Blood Culture - Blood, Arm, Right [[id number]] Collected: 01/14/21 1827    Specimen: Blood from Arm, Right Updated: 01/17/21 1845     Blood Culture No growth at 3 days    Blood Culture - Blood, Arm, Left [[id number]] Collected: 01/14/21 1827    Specimen: Blood from Arm, Left Updated: 01/17/21 1845     Blood Culture No growth at 3 days    POC Glucose Once [[id number]]  (Abnormal) Collected: 01/17/21 1654    Specimen: Blood Updated: 01/17/21 1657     Glucose 156 mg/dL      Comment: Serial Number: [id number]:  [id number]       Blood Culture - Blood, Arm, Left [[id number]] Collected: 01/15/21 1242    Specimen: Blood from Arm, Left Updated: 01/17/21 1302     Blood Culture No growth at 2  "days    POC Glucose Once [[id number]]  (Abnormal) Collected: 01/17/21 1107    Specimen: Blood Updated: 01/17/21 1109     Glucose 210 mg/dL      Comment: Serial Number: [id number]:  [id number]       Ferritin [[id number]]  (Abnormal) Collected: 01/17/21 0346    Specimen: Blood Updated: 01/17/21 0847     Ferritin 3,769.00 ng/mL     Narrative:      Results may be falsely decreased if patient taking Biotin.      POC Glucose Once [[id number]]  (Abnormal) Collected: 01/17/21 0724    Specimen: Blood Updated: 01/17/21 0726     Glucose 172 mg/dL      Comment: Serial Number: [id number]:  [id number]       Phosphorus [[id number]]  (Normal) Collected: 01/17/21 0346    Specimen: Blood Updated: 01/17/21 0622     Phosphorus 3.0 mg/dL         No results found for: HGBA1C        Results from last 7 days   Lab Units 01/14/21  1858   PH, ARTERIAL pH units 7.549*   PO2 ART mm Hg 68.5*   PCO2, ARTERIAL mm Hg 35.0   HCO3 ART mmol/L 30.6*     No results found for: LIPASE  Lab Results   Component Value Date    CHLPL 95 02/17/2018    TRIG 176 (H) 02/17/2018    HDL 18 (L) 02/17/2018    LDL 42 02/17/2018       Lab Results   Lab Value Date/Time    FINALDX  08/02/2018 1140     \"LEFT OPTHALMIC SOCKET\":        BENIGN FIBROUS TISSUE/SCAR.        FOREIGN BODY MATERIAL (GROSS ONLY) - CONSISTENT WITH HYDROXYAPATITE.        CPT: 62494         Microbiology Results (last 10 days)     Procedure Component Value - Date/Time    Clostridium Difficile EIA - Stool, Per Rectum [[id number]]  (Normal) Collected: 01/16/21 1246    Lab Status: Final result Specimen: Stool from Per Rectum Updated: 01/16/21 1332     C Diff GDH / Toxin Negative    Legionella Antigen, Urine - Urine, Urine, Clean Catch [[id number]]  (Normal) Collected: 01/16/21 1245    Lab Status: Final result Specimen: Urine, Clean Catch Updated: 01/16/21 1348     LEGIONELLA ANTIGEN, URINE Negative    S. Pneumo Ag Urine or CSF - Urine, Urine, Clean Catch [[id number]]  (Normal) Collected: 01/16/21 " 1245    Lab Status: Final result Specimen: Urine, Clean Catch Updated: 01/16/21 1348     Strep Pneumo Ag Negative    Eosinophil Smear - Urine, Urine, Clean Catch [[id number]]  (Normal) Collected: 01/16/21 1245    Lab Status: Final result Specimen: Urine, Clean Catch Updated: 01/16/21 1410     Eosinophil Smear 0 % EOS/100 Cells     Blood Culture - Blood, Arm, Left [[id number]] Collected: 01/15/21 1242    Lab Status: Preliminary result Specimen: Blood from Arm, Left Updated: 01/17/21 1302     Blood Culture No growth at 2 days    Respiratory Panel PCR w/COVID-19(SARS-CoV-2) SIMRAN/KEYSHAWN/SKYLA/PAD/COR/MAD/SUDHIR In-House, NP Swab in UTM/VTM, 3-4 HR TAT - Swab, Nasopharynx [[id number]]  (Abnormal) Collected: 01/15/21 1206    Lab Status: Final result Specimen: Swab from Nasopharynx Updated: 01/15/21 1316     ADENOVIRUS, PCR Not Detected     Coronavirus 229E Not Detected     Coronavirus HKU1 Not Detected     Coronavirus NL63 Not Detected     Coronavirus OC43 Not Detected     COVID19 Detected     Human Metapneumovirus Not Detected     Human Rhinovirus/Enterovirus Not Detected     Influenza A PCR Not Detected     Influenza B PCR Not Detected     Parainfluenza Virus 1 Not Detected     Parainfluenza Virus 2 Not Detected     Parainfluenza Virus 3 Not Detected     Parainfluenza Virus 4 Not Detected     RSV, PCR Not Detected     Bordetella pertussis pcr Not Detected     Bordetella parapertussis PCR Not Detected     Chlamydophila pneumoniae PCR Not Detected     Mycoplasma pneumo by PCR Not Detected    Narrative:      Fact sheet for providers: https://docs.[employer]/wp-content/uploads/SMA1922-3636-KX0.1-EUA-Provider-Fact-Sheet-3.pdf    Fact sheet for patients: https://docs.[employer]/wp-content/uploads/DAP3806-5187-JS8.1-EUA-Patient-Fact-Sheet-1.pdf    Test performed by PCR.    Blood Culture - Blood, Arm, Right [[id number]] Collected: 01/14/21 1827    Lab Status: Preliminary result Specimen: Blood from Arm, Right Updated: 01/17/21 1849      Blood Culture No growth at 3 days    Blood Culture - Blood, Arm, Left [[id number]] Collected: 01/14/21 1827    Lab Status: Preliminary result Specimen: Blood from Arm, Left Updated: 01/17/21 1845     Blood Culture No growth at 3 days          ECG/EMG Results (most recent)     Procedure Component Value Units Date/Time    ECG 12 Lead [[id number]] Collected: 01/14/21 2155     Updated: 01/15/21 1738     QT Interval 427 ms     Narrative:      HEART RATE= 90  bpm  RR Interval= 667  ms  WV Interval=   ms  P Horizontal Axis=   deg  P Front Axis= 0  deg  QRSD Interval= 134  ms  QT Interval= 427  ms  QRS Axis= -45  deg  T Wave Axis= 157  deg  - ABNORMAL ECG -  Ventricular-paced rhythm  Biventricular paced rhythm  When compared with ECG of 25-Jul-2018 8:56:31,  Significant change in rhythm  Electronically Signed By: Heber Garduno (SKYLA) 15-Albert-2021 17:36:17  Date and Time of Study: 2021-01-14 21:55:52                    Xr Chest 1 View    Result Date: 1/14/2021  1. There are bilateral airspace opacities which are consistent with pneumonia and/or pulmonary edema. 2. Cardiomegaly.  Electronically Signed By-Kristi Menon MD On:1/14/2021 7:01 PM This report was finalized on [id number] by  Kristi Menon MD.    Us Renal Bilateral    Result Date: 1/15/2021  1. Negative for hydronephrosis. 2. There is moderate right renal cortical thinning. There is mild left renal cortical thinning. 2. Small incidental renal cysts.  Electronically Signed By-Kristi Menon MD On:1/15/2021 10:18 PM This report was finalized on [id number] by  Kristi Menon MD.          Xrays, labs reviewed personally by physician.    Medication Review:   I have reviewed the patient's current medication list      Scheduled Meds  allopurinol, 300 mg, Oral, Daily  ascorbic acid, 500 mg, Oral, Q6H  aspirin, 81 mg, Oral, Daily  atorvastatin, 40 mg, Oral, Nightly  carvedilol, 12.5 mg, Oral, Daily With Breakfast & Dinner  cholecalciferol, 1,000 Units, Oral,  Daily  dexamethasone, 6 mg, Oral, Daily With Breakfast  doxycycline, 100 mg, Oral, Q12H  famotidine, 20 mg, Oral, Daily  heparin (porcine), 7,500 Units, Subcutaneous, Q12H  insulin glargine, 10 Units, Subcutaneous, Nightly  insulin lispro, 0-9 Units, Subcutaneous, TID AC  isosorbide mononitrate, 30 mg, Oral, Daily  levoFLOXacin, 750 mg, Intravenous, Q48H  remdesivir, 100 mg, Intravenous, Q24H  sodium chloride, 10 mL, Intravenous, Q12H  thiamine, 100 mg, Oral, Daily  vitamin B-12, 1,000 mcg, Oral, Daily  vitamin B-6, 100 mg, Oral, Daily  zinc sulfate, 440 mg, Oral, Daily        Meds Infusions  Pharmacy Consult - Remdesivir,   Pharmacy Consult - Steroid Insulin Protocol,   Pharmacy to Dose Heparin,   sodium chloride, 60 mL/hr, Last Rate: Stopped (01/16/21 2154)        Meds PRN  •  acetaminophen  •  dextrose  •  dextrose  •  glucagon (human recombinant)  •  guaiFENesin-dextromethorphan  •  HYDROcodone-acetaminophen  •  insulin lispro **AND** insulin lispro  •  loperamide  •  LORazepam **OR** LORazepam **OR** LORazepam **OR** LORazepam **OR** LORazepam **OR** LORazepam  •  ondansetron  •  Pharmacy Consult - Remdesivir  •  Pharmacy Consult - Steroid Insulin Protocol  •  Pharmacy to Dose Heparin  •  potassium chloride  •  potassium chloride  •  [COMPLETED] Insert peripheral IV **AND** sodium chloride  •  sodium chloride        Assessment/Plan   Assessment/Plan     Active Hospital Problems    Diagnosis  POA   • CHF (congestive heart failure) (CMS/[facility]) [I50.9]  Unknown   • AF (atrial fibrillation) (CMS/[facility]) [I48.91]  Unknown   • AICD (automatic cardioverter/defibrillator) present [Z95.810]  Unknown   • MI (myocardial infarction) (CMS/[facility]) [I21.9]  Unknown   • Stroke (CMS/[facility]) [I63.9]  Unknown   • Pneumonia due to COVID-19 virus [U07.1, J12.82]  Unknown   • Sepsis (CMS/[facility]) [A41.9]  Yes   • Acute kidney injury superimposed on CKD (CMS/[facility]) [N17.9, N18.9]  Yes      Resolved Hospital Problems   No resolved problems to display.        MEDICAL DECISION MAKING COMPLEXITY BY PROBLEM:     COVID-19 infection with pneumonia  -- 01/16/21 -- COVID-19 test positive  -- supplemental O2 to keep sats >93% ; currently on   -- allow permissive hypoxia to sats >86%  -- pronate patient as tolerated for better oxygenation  -- anti-inflammatory supplements/treatments (per [facility] COVID-19 protocol)     -- Vitamin C 500mg PO q6h     -- Vitamin D3 20,000-60,000 iu x1 (200-500 mcg) then 20,000 iu (200 mcg) weekly     -- Zinc gluconate 100 mg Daily (zinc sulfate 440 mg)     -- Melatonin 10mg nightly     -- B complex vitamins (B12, B6)     -- Famotidine 40mg/day     -- Enoxaparin as per pharmacy protocol     -- Steroids: Dexamethasone     -- Remdesivir: 5-day course initiated 1/15/2021 through 1/19/2021     -- Ivermectin 12 mg on 1/15/2021, 15 mg on 1/17/2021  -- empiric antibiotics: Doxycycline and Levaquin  -- admission labs: PCT, CRP, IL-6, BNP, Trop, Ferritin, D-dimer, Mg, Neutrophil/Lymphocyte ratio  -- monitor daily labs     -- CMP, CBC, CRP, Ferritin, PCT, D-dimer  -- continue supportive care as needed: antipyretics, breathing treatments, mucolytics, etc.    SAMIR on CKD stage 3b  -- hold lisinopril and diuretics  -- Consult Nephrology  -- continue to monitor BMP    Diabetes mellitus type 2  -- poorly controlled  -- SSI coverage  -- [employer] AC/HS    Remaining chronic conditions as per H&P with home medications for management.    VTE Prophylaxis -   Mechanical Order History:      Ordered        01/15/21 1257  Place Sequential Compression Device  Once         01/15/21 1257  Maintain Sequential Compression Device  Continuous                 Pharmalogical Order History:      Ordered     Dose Route Frequency Stop    01/15/21 0027  dabigatran etexilate (PRADAXA) capsule 150 mg  Status:  Discontinued      150 mg PO 2 Times Daily 01/15/21 1109                  Code Status -   Code Status and Medical Interventions:   Ordered at: 01/14/21 2152     Code Status:    CPR      Medical Interventions (Level of Support Prior to Arrest):    Full       This patient has been examined wearing appropriate Personal Protective Equipment. 01/18/21        Discharge Planning    Pending clinical course        Electronically signed by Tiffany Guardado MD, 01/18/21, 05:59 EST.  [facility] Hospitalist Team

## 2021-01-18 NOTE — PROGRESS NOTES
Exercise Oximetry    Patient Name:Moe Lora   MRN: [id number]   Date: 01/18/21             ROOM AIR BASELINE   SpO2% 85   Heart Rate    Blood Pressure      EXERCISE ON ROOM AIR SpO2% EXERCISE ON O2 @  2LPM SpO2%   1 MINUTE  1 MINUTE 85   2 MINUTES  2 MINUTES 87   3 MINUTES  3 MINUTES 87   4 MINUTES  4 MINUTES 88   5 MINUTES  5 MINUTES 89   6 MINUTES  6 MINUTES 90              Distance Walked   Distance Walked   Dyspnea (Stevie Scale)   Dyspnea (Stevie Scale)   Fatigue (Stevie Scale)   Fatigue (Stevie Scale)   SpO2% Post Exercise  93 SpO2% Post Exercise   HR Post Exercise   HR Post Exercise   Time to Recovery  5 min Time to Recovery     Comments: pts room air SpO2 was 85%,  Placed pt on 2LNC and started to walk, slowly the pts SpO2 increased to 90% over the course of 6 minutes. Pt did not complain of shortness of air while ambulating on the 2LNC.  pts SpO2 on 2LNC after walk and resting was 93%.      Macie Hickey, CRT  1/18/2021  16:41 EST

## 2021-01-18 NOTE — PROGRESS NOTES
"NEPHROLOGY PROGRESS NOTE------[facility]    [facility]  [phone]  Anastacia Gallegos MD      Patient Care Team:  Provider, No Known as PCP - Felicitas Poole MD as Consulting Physician (Nephrology)      Provider:  Anastacia Gallegos MD  Patient Name: Moe Lora  :  1945    SUBJECTIVE:    F/U ARF/SAMIR/CRF/CKD STG 3    Still with mild SOB, cough, congestion. No urinary sx    Medication:  allopurinol, 300 mg, Oral, Daily  ascorbic acid, 500 mg, Oral, Q6H  aspirin, 81 mg, Oral, Daily  atorvastatin, 40 mg, Oral, Nightly  carvedilol, 12.5 mg, Oral, Daily With Breakfast & Dinner  cholecalciferol, 1,000 Units, Oral, Daily  dexamethasone, 6 mg, Oral, Daily With Breakfast  doxycycline, 100 mg, Oral, Q12H  famotidine, 20 mg, Oral, Daily  heparin (porcine), 7,500 Units, Subcutaneous, Q12H  insulin glargine, 10 Units, Subcutaneous, Nightly  insulin lispro, 0-9 Units, Subcutaneous, TID AC  isosorbide mononitrate, 30 mg, Oral, Daily  levoFLOXacin, 750 mg, Intravenous, Q48H  remdesivir, 100 mg, Intravenous, Q24H  sodium chloride, 10 mL, Intravenous, Q12H  thiamine, 100 mg, Oral, Daily  vitamin B-12, 1,000 mcg, Oral, Daily  vitamin B-6, 100 mg, Oral, Daily  zinc sulfate, 440 mg, Oral, Daily      Pharmacy Consult - Remdesivir,   Pharmacy Consult - Steroid Insulin Protocol,   Pharmacy to Dose Heparin,   sodium chloride, 60 mL/hr, Last Rate: Stopped (21)        OBJECTIVE    Vital Sign Min/Max for last 24 hours  Temp  Min: 95.4 °F (35.2 °C)  Max: 97.2 °F (36.2 °C)   BP  Min: 107/65  Max: 138/84   Pulse  Min: 84  Max: 111   Resp  Min: 14  Max: 20   SpO2  Min: 91 %  Max: 96 %   No data recorded   Weight  Min: 91.3 kg (201 lb 4.5 oz)  Max: 91.3 kg (201 lb 4.5 oz)     Flowsheet Rows      First Filed Value   Admission Height  137.2 cm (54\") Documented at 2021   Admission Weight  79.4 kg (175 lb) Documented at 2021    "       No intake/output data recorded.  I/O last 3 completed shifts:  In: 3118 [P.O.:960; I.V.:2008; IV Piggyback:150]  Out: 2800 [Urine:2800]    Physical Exam:  General Appearance: alert, appears stated age and cooperative  Head: normocephalic, without obvious abnormality and atraumatic  Eyes: conjunctivae and sclerae normal and no icterus  Neck: supple and no JVD  Lungs: clear to auscultation and respirations regular  Heart: regular rhythm & normal rate and normal S1, S2 +PARUL  Chest: Wall no abnormalities observed  Abdomen: normal bowel sounds and soft non-tender  Extremities: moves extremities well, no edema, no cyanosis and no redness  Skin: no bleeding, bruising or rash, turgor normal, color normal and no lesions noted  Neurologic: Alert, and oriented. No focal deficits    Labs:    WBC WBC   Date Value Ref Range Status   01/18/2021 7.40 3.40 - 10.80 10*3/mm3 Final   01/17/2021 4.00 3.40 - 10.80 10*3/mm3 Final   01/16/2021 6.70 3.40 - 10.80 10*3/mm3 Final      HGB Hemoglobin   Date Value Ref Range Status   01/18/2021 11.9 (L) 13.0 - 17.7 g/dL Final   01/17/2021 11.9 (L) 13.0 - 17.7 g/dL Final   01/16/2021 12.4 (L) 13.0 - 17.7 g/dL Final      HCT Hematocrit   Date Value Ref Range Status   01/18/2021 35.1 (L) 37.5 - 51.0 % Final   01/17/2021 36.1 (L) 37.5 - 51.0 % Final   01/16/2021 38.4 37.5 - 51.0 % Final      Platlets No results found for: LABPLAT   MCV MCV   Date Value Ref Range Status   01/18/2021 87.9 79.0 - 97.0 fL Final   01/17/2021 87.4 79.0 - 97.0 fL Final   01/16/2021 88.1 79.0 - 97.0 fL Final          Sodium Sodium   Date Value Ref Range Status   01/17/2021 136 136 - 145 mmol/L Final   01/16/2021 136 136 - 145 mmol/L Final      Potassium Potassium   Date Value Ref Range Status   01/17/2021 3.6 3.5 - 5.2 mmol/L Final   01/16/2021 3.1 (L) 3.5 - 5.2 mmol/L Final      Chloride Chloride   Date Value Ref Range Status   01/17/2021 100 98 - 107 mmol/L Final   01/16/2021 95 (L) 98 - 107 mmol/L Final      CO2 CO2    Date Value Ref Range Status   01/17/2021 28.0 22.0 - 29.0 mmol/L Final   01/16/2021 26.0 22.0 - 29.0 mmol/L Final      BUN BUN   Date Value Ref Range Status   01/17/2021 49 (H) 8 - 23 mg/dL Final   01/16/2021 51 (H) 8 - 23 mg/dL Final      Creatinine Creatinine   Date Value Ref Range Status   01/17/2021 1.57 (H) 0.76 - 1.27 mg/dL Final   01/16/2021 1.97 (H) 0.76 - 1.27 mg/dL Final      Calcium Calcium   Date Value Ref Range Status   01/17/2021 9.4 8.6 - 10.5 mg/dL Final   01/16/2021 9.2 8.6 - 10.5 mg/dL Final      PO4 No components found for: PO4   Albumin Albumin   Date Value Ref Range Status   01/17/2021 3.40 (L) 3.50 - 5.20 g/dL Final   01/16/2021 3.50 3.50 - 5.20 g/dL Final      Magnesium Magnesium   Date Value Ref Range Status   01/17/2021 2.3 1.6 - 2.4 mg/dL Final   01/16/2021 2.1 1.6 - 2.4 mg/dL Final      Uric Acid No components found for: URIC ACID     Imaging Results (Last 72 Hours)     Procedure Component Value Units Date/Time    US Renal Bilateral [[id number]] Collected: 01/15/21 2207     Updated: 01/15/21 2220    Narrative:      Examination:      Date of Exam: 1/15/2021 6:06 PM     Indication: ARF/CKD; A41.9-Sepsis, unspecified organism; U07.1-COVID-19;  J12.82-Pneumonia due to Coronavirus disease 2019.     Comparison: None available.     Technique: Grayscale and color Doppler ultrasound evaluation of the  kidneys and urinary bladder was performed     Findings:  The right kidney is about 9.5 cm in length by 4 x 3.5 cm. There is a 9  mm anechoic lesion in the lower pole consistent with a cyst. There is  moderate cortical thinning. Parenchymal echogenicity is similar to the  liver. There is color flow in the right kidney. Negative for  hydronephrosis.     The left kidney is about 11.5 cm in length by 5.5 x 5 cm. There is an 11  mm anechoic lesion in the left kidney. There is mild cortical  thickening. There is color flow in the left kidney. Negative for  hydronephrosis.     Limited exam of the bladder  shows volume of 20 cc.       Impression:      1. Negative for hydronephrosis.  2. There is moderate right renal cortical thinning. There is mild left  renal cortical thinning.  2. Small incidental renal cysts.     Electronically Signed By-Kristi Menon MD On:1/15/2021 10:18 PM  This report was finalized on [id number] by  Kristi Menon MD.          Results for orders placed during the hospital encounter of 01/14/21   XR Chest 1 View    Narrative Examination: XR CHEST 1 VW-     Date of Exam: 1/14/2021 6:36 PM     Indication: Dyspnea.       Comparison: None available.     Technique: 1 view of the chest      FINDINGS:  There is cardiomegaly. There has been a coronary artery bypass. There  are 3 pacer leads in the heart. There our moderate airspace opacities in  the left mid-lower lung and in the right lower lung. Right pleural  effusion is difficult to exclude. Negative for pneumothorax. No bone  abnormality noted.       Impression 1. There are bilateral airspace opacities which are consistent with  pneumonia and/or pulmonary edema.  2. Cardiomegaly.     Electronically Signed By-Kristi Menon MD On:1/14/2021 7:01 PM  This report was finalized on [id number] by  Kristi Menon MD.              ASSESSMENT / PLAN      Sepsis (CMS/HCC)    Acute kidney injury superimposed on CKD (CMS/HCC)    CHF (congestive heart failure) (CMS/HCC)    AF (atrial fibrillation) (CMS/HCC)    AICD (automatic cardioverter/defibrillator) present    MI (myocardial infarction) (CMS/[facility])    Stroke (CMS/[facility])    Pneumonia due to COVID-19 virus    1. ARF/SAMIR/CRF/CKD STG 3-------Nonoliguric. BUN/Cr down. +ARF/SAMIR on top of known CRF/CKD STG 3A with what appears to be a baseline creatinine of 1.3 based on review of old labs. Unknown if patient has baseline proteinuria or hematuria. CRF/CKD STG 3A is likely secondary to DGS vs. HTN NS. +ARF/SAMIR is secondary to prerenal state/intravascular volume depletion with concomitant Lasix use (BNP is clinically  inaccurate) and hemodynamic fluctuation from decompensated Pulmonary state. Hold Lasix. Hydrate GENTLY.  No NSAIDs or IV dye. D/C prn Ibuprofen. Dose meds for CrCl less than 10 cc/min until ARF/SAMIR is resolved.      2. COVID 19 + PNA-------Blood cx pending. S/P Remdesivir. Per PCP/Pulmonary. On Zinc, Vitamin D, Vitamin B1/B-12/B-6. On Levaquin also     3. ANEMIA------- H/H stable     4. METABOLIC ALKALOSIS-----Better with holding Lasix and hydrating     5. OA/DJD------No NSAIDs. On Allopurinol     6. BPH AND H/O NEPHROLITHIASIS-------PVR okay     7. H/O CHF/ISCHEMIC CM-------Currently without gross overload clinically despite elevated BNP. Hold Lasix and hydrate GENTLY for reasons stated above     8. DMII-------BS okay. Glucometers, SSI     9. GERD/PUD PROPHYLAXIS------Renal dose adjusted Pepcid     10. HYPERLIPIDEMIA-----On Statin.       11. ELEVATED D-DIMER/DVT PROPHYLAXIS-----SCD b/c of thrombocytopenia (Per PCP)     12. THROMBOCYTOPENIA-----SPEP pending    13. HYPOCALCEMIA------Replaced    14. HYPOKALEMIA------Replaced        Anastacia Gallegos MD  [facility]  [phone]  01/18/21  07:26 EST

## 2021-01-18 NOTE — PLAN OF CARE
Problem: Adult Inpatient Plan of Care  Goal: Plan of Care Review  1/18/2021 1303 by Ce Oneill RN  Outcome: Ongoing, Progressing  Flowsheets (Taken 1/18/2021 1303)  Outcome Summary: Patient is on O2 via NC. Patient has IV remdesivir ordered. Patient's IV fluids were discontinued today. Nephrology is on board. Will continue to observe.  1/18/2021 1303 by Ce Oneill RN  Outcome: Ongoing, Progressing  Flowsheets (Taken 1/18/2021 1303)  Outcome Summary: Patient is on O2 via NC. Patient has IV remdesivir ordered. Patient's IV fluids were discontinued today. Nephrology is on board. Will continue to observe.   Goal Outcome Evaluation:  Plan of Care Reviewed With: patient  Progress: improving

## 2021-01-18 NOTE — PROGRESS NOTES
"      [facility] Medicine Services Daily Progress Note      Hospitalist Team  LOS 0 days      Patient Care Team:  Provider, No Known as PCP - Felicitas Poole MD as Consulting Physician (Nephrology)    Patient Location: 205/1      Subjective   Subjective     Chief Complaint / Subjective  Chief Complaint   Patient presents with   • Exposure To Known Illness         Brief Synopsis of Hospital Course/HPI    The patient is a 75-year-old male that presented to the emergency room on 1/14/2021 complaining of several days of fevers, productive cough, nausea and shortness of air.  The patient was diagnosed with COVID-19 about 8 days before coming to the ED.    In the ED, the patient had fever 103.1F.  ABG showed pH of 7.549, PCO2 35, PO2 68.5 and O2 saturation 95.6.  Chest x-ray showed pneumonia.    Date::    1/18/21: Feels better.  Wants to go home.  [facility].      Review of Systems   All other systems reviewed and are negative.        Objective   Objective      Vital Signs  Temp:  [95.4 °F (35.2 °C)-97.8 °F (36.6 °C)] 97.4 °F (36.3 °C)  Heart Rate:  [] 107  Resp:  [14-19] 19  BP: (121-133)/(66-86) 123/73  Oxygen Therapy  SpO2: 95 %  Pulse Oximetry Type: Continuous  Device (Oxygen Therapy): nasal cannula  Flow (L/min): 2  Flowsheet Rows      First Filed Value   Admission Height  137.2 cm (54\") Documented at 01/14/2021 1813   Admission Weight  79.4 kg (175 lb) Documented at 01/14/2021 1813        Intake & Output (last 3 days)       01/15 0701 - 01/16 0700 01/16 0701 - 01/17 0700 01/17 0701 - 01/18 0700 01/18 0701 - 01/19 0700    P.O. 300 720 720 950    I.V. (mL/kg)  1528 (18.4) 480 (5.3)     IV Piggyback  150      Total Intake(mL/kg) 300 (3.6) 2398 (28.8) 1200 (13.1) 950 (10.4)    Urine (mL/kg/hr) 250 (0.1) 1150 (0.6) 2400 (1.1) 400 (0.4)    Stool 0 0 0     Total Output 250 1150 2400 400    Net +50 +1248 -1200 +550            Stool Unmeasured Occurrence 3 x 1 x 1 x     "     Lines, Drains & Airways    Active LDAs     Name:   Placement date:   Placement time:   Site:   Days:    Peripheral IV 01/14/21 1824 Left Antecubital   01/14/21 1824    Antecubital   3                  Physical Exam:    Physical Exam  HENT:      Head: Normocephalic.      Nose: Nose normal.   Eyes:      General: No scleral icterus.     Extraocular Movements: Extraocular movements intact.      Pupils: Pupils are equal, round, and reactive to light.   Neck:      Musculoskeletal: Normal range of motion.   Cardiovascular:      Rate and Rhythm: Normal rate and regular rhythm.   Pulmonary:      Effort: Pulmonary effort is normal.      Breath sounds: Normal breath sounds.   Abdominal:      General: Bowel sounds are normal.      Palpations: Abdomen is soft.   Musculoskeletal: Normal range of motion.   Skin:     General: Skin is warm.   Neurological:      General: No focal deficit present.      Mental Status: He is alert and oriented to person, place, and time.   Psychiatric:         Mood and Affect: Mood normal.           Procedures:        Results Review:     I reviewed the patient's new clinical results.      Lab Results (last 24 hours)     Procedure Component Value Units Date/Time    POC Glucose Once [[id number]]  (Abnormal) Collected: 01/18/21 1638    Specimen: Blood Updated: 01/18/21 1641     Glucose 183 mg/dL      Comment: Serial Number: [id number]:  [id number]       Blood Gas, Arterial - [[id number]]  (Abnormal) Collected: 01/18/21 1613    Specimen: Arterial Blood Updated: 01/18/21 1617     Site Right Radial     Skyler's Test Positive     pH, Arterial 7.426 pH units      pCO2, Arterial 33.5 mm Hg      pO2, Arterial 48.7 mm Hg      HCO3, Arterial 22.1 mmol/L      Base Excess, Arterial -1.8 mmol/L      Comment: Serial Number: [id number]:  [id number]        O2 Saturation, Arterial 85.4 %      CO2 Content 23.1 mmol/L      Barometric Pressure for Blood Gas --     Comment: N/A        Modality Room Air     FIO2 21 %       Hemodilution No    Protein Elec + Interp, Serum [[id number]] Collected: 01/15/21 1242    Specimen: Blood Updated: 01/18/21 1610     Total Protein 6.4 g/dL      Albumin 3.4 g/dL      Alpha-1-Globulin 0.3 g/dL      Alpha-2-Globulin 0.9 g/dL      Beta Globulin 0.9 g/dL      Gamma Globulin 0.9 g/dL      M-Ramon Comment: g/dL      Comment: SPE SHOWS ASYMMETRICAL GAMMA.        Globulin 3.0 g/dL      A/G Ratio 1.1     Please note Comment     Comment: Protein electrophoresis scan will follow via computer, mail, or   delivery.        SPE Interpretation Comment     Comment: Faint band in gamma region suspicious for monoclonal immunoglobulin.  This band may represent a benign spike as seen in older people or  could be a paraprotein as seen in Multiple Myeloma, Waldenstrom's  Macroglobulinemia or Lymphoma. Depending on clinical circumstances,  further diagnostic studies may include serum immunofixation or serum  free light chain quantitation.       Narrative:      Performed at:  [address] [place] [address]  [id number]  : Baldemar Walker PhD, Phone:  [phone]    Respiratory Culture - Sputum, Cough [[id number]] Collected: 01/17/21 1731    Specimen: Sputum from Cough Updated: 01/18/21 1348     Respiratory Culture Rare Normal Respiratory Caroline: NO S.aureus/MRSA or Pseudomonas aeruginosa     Gram Stain Rare (1+) WBCs per low power field      Rare (1+) Mixed bacterial morphotypes seen on Gram Stain    Blood Culture - Blood, Arm, Left [[id number]] Collected: 01/15/21 1242    Specimen: Blood from Arm, Left Updated: 01/18/21 1302     Blood Culture No growth at 3 days    POC Glucose Once [[id number]]  (Abnormal) Collected: 01/18/21 1134    Specimen: Blood Updated: 01/18/21 1154     Glucose 122 mg/dL      Comment: Serial Number: [id number]:  [id number]       Lactate Dehydrogenase [[id number]]  (Abnormal) Collected: 01/18/21 0609    Specimen: Blood Updated: 01/18/21 0922      U/L       Comment: Specimen hemolyzed.  Results may be affected.       C-reactive Protein [[id number]]  (Abnormal) Collected: 01/18/21 0609    Specimen: Blood Updated: 01/18/21 0920     C-Reactive Protein 3.97 mg/dL     Ferritin [[id number]]  (Abnormal) Collected: 01/18/21 0609    Specimen: Blood Updated: 01/18/21 0800     Ferritin 2,281.00 ng/mL     Narrative:      Results may be falsely decreased if patient taking Biotin.      Phosphorus [[id number]]  (Abnormal) Collected: 01/18/21 0609    Specimen: Blood Updated: 01/18/21 0751     Phosphorus 2.2 mg/dL     Comprehensive Metabolic Panel [[id number]]  (Abnormal) Collected: 01/18/21 0609    Specimen: Blood Updated: 01/18/21 0737     Glucose 132 mg/dL      BUN 38 mg/dL      Creatinine 1.36 mg/dL      Sodium 138 mmol/L      Potassium 3.6 mmol/L      Chloride 104 mmol/L      CO2 23.0 mmol/L      Calcium 9.2 mg/dL      Total Protein 5.6 g/dL      Albumin 3.50 g/dL      ALT (SGPT) 24 U/L      AST (SGOT) 48 U/L      Alkaline Phosphatase 240 U/L      Total Bilirubin 0.7 mg/dL      eGFR Non African Amer 51 mL/min/1.73      Globulin 2.1 gm/dL      A/G Ratio 1.7 g/dL      BUN/Creatinine Ratio 27.9     Anion Gap 11.0 mmol/L     Narrative:      GFR Normal >60  Chronic Kidney Disease <60  Kidney Failure <15      CK [[id number]]  (Normal) Collected: 01/18/21 0609    Specimen: Blood Updated: 01/18/21 0737     Creatine Kinase 96 U/L     Magnesium [[id number]]  (Normal) Collected: 01/18/21 0609    Specimen: Blood Updated: 01/18/21 0737     Magnesium 2.3 mg/dL     POC Glucose Once [[id number]]  (Abnormal) Collected: 01/18/21 0733    Specimen: Blood Updated: 01/18/21 0735     Glucose 128 mg/dL      Comment: Serial Number: [id number]:  [id number]       Fibrinogen [[id number]]  (Normal) Collected: 01/18/21 0609    Specimen: Blood Updated: 01/18/21 0729     Fibrinogen 371 mg/dL     D-dimer, Quantitative [[id number]]  (Abnormal) Collected: 01/18/21 0609    Specimen: Blood Updated: 01/18/21 0724      D-Dimer, Quantitative 0.64 mg/L (FEU)     Narrative:      Reference Range  --------------------------------------------------------------------     < 0.50   Negative Predictive Value  0.50-0.59   Indeterminate    >= 0.60   Probable VTE             A very low percentage of patients with DVT may yield D-Dimer results   below the cut-off of 0.50 mg/L FEU.  This is known to be more   prevalent in patients with distal DVT.             Results of this test should always be interpreted in conjunction with   the patient's medical history, clinical presentation and other   findings.  Clinical diagnosis should not be based on the result of   INNOVANCE D-Dimer alone.    CBC & Differential [[id number]]  (Abnormal) Collected: 01/18/21 0609    Specimen: Blood Updated: 01/18/21 0721    Narrative:      The following orders were created for panel order CBC & Differential.  Procedure                               Abnormality         Status                     ---------                               -----------         ------                     CBC Auto Differential[[id number]]        Abnormal            Final result                 Please view results for these tests on the individual orders.    CBC Auto Differential [[id number]]  (Abnormal) Collected: 01/18/21 0609    Specimen: Blood Updated: 01/18/21 0721     WBC 7.40 10*3/mm3      RBC 4.00 10*6/mm3      Hemoglobin 11.9 g/dL      Hematocrit 35.1 %      MCV 87.9 fL      MCH 29.7 pg      MCHC 33.8 g/dL      RDW 17.3 %      RDW-SD 52.9 fl      MPV 9.5 fL      Platelets 92 10*3/mm3      Neutrophil % 92.3 %      Lymphocyte % 3.8 %      Monocyte % 3.8 %      Eosinophil % 0.0 %      Basophil % 0.1 %      Neutrophils, Absolute 6.80 10*3/mm3      Lymphocytes, Absolute 0.30 10*3/mm3      Monocytes, Absolute 0.30 10*3/mm3      Eosinophils, Absolute 0.00 10*3/mm3      Basophils, Absolute 0.00 10*3/mm3      nRBC 0.0 /100 WBC     POC Glucose Once [[id number]]  (Abnormal) Collected: 01/17/21 2015     "Specimen: Blood Updated: 01/17/21 2017     Glucose 233 mg/dL      Comment: Serial Number: [id number]:  [id number]       Blood Culture - Blood, Arm, Right [[id number]] Collected: 01/14/21 1827    Specimen: Blood from Arm, Right Updated: 01/17/21 1845     Blood Culture No growth at 3 days    Blood Culture - Blood, Arm, Left [[id number]] Collected: 01/14/21 1827    Specimen: Blood from Arm, Left Updated: 01/17/21 1845     Blood Culture No growth at 3 days        No results found for: HGBA1C        Results from last 7 days   Lab Units 01/18/21  1613   PH, ARTERIAL pH units 7.426   PO2 ART mm Hg 48.7*   PCO2, ARTERIAL mm Hg 33.5*   HCO3 ART mmol/L 22.1     No results found for: LIPASE  Lab Results   Component Value Date    CHLPL 95 02/17/2018    TRIG 176 (H) 02/17/2018    HDL 18 (L) 02/17/2018    LDL 42 02/17/2018       Lab Results   Lab Value Date/Time    FINALDX  08/02/2018 1140     \"LEFT OPTHALMIC SOCKET\":        BENIGN FIBROUS TISSUE/SCAR.        FOREIGN BODY MATERIAL (GROSS ONLY) - CONSISTENT WITH HYDROXYAPATITE.        CPT: 71181         Microbiology Results (last 10 days)     Procedure Component Value - Date/Time    Respiratory Culture - Sputum, Cough [[id number]] Collected: 01/17/21 1731    Lab Status: Preliminary result Specimen: Sputum from Cough Updated: 01/18/21 1348     Respiratory Culture Rare Normal Respiratory Caroline: NO S.aureus/MRSA or Pseudomonas aeruginosa     Gram Stain Rare (1+) WBCs per low power field      Rare (1+) Mixed bacterial morphotypes seen on Gram Stain    Clostridium Difficile EIA - Stool, Per Rectum [[id number]]  (Normal) Collected: 01/16/21 1246    Lab Status: Final result Specimen: Stool from Per Rectum Updated: 01/16/21 1332     C Diff GDH / Toxin Negative    Legionella Antigen, Urine - Urine, Urine, Clean Catch [[id number]]  (Normal) Collected: 01/16/21 1245    Lab Status: Final result Specimen: Urine, Clean Catch Updated: 01/16/21 1348     LEGIONELLA ANTIGEN, URINE Negative    S. " Pneumo Ag Urine or CSF - Urine, Urine, Clean Catch [[id number]]  (Normal) Collected: 01/16/21 1245    Lab Status: Final result Specimen: Urine, Clean Catch Updated: 01/16/21 1348     Strep Pneumo Ag Negative    Eosinophil Smear - Urine, Urine, Clean Catch [[id number]]  (Normal) Collected: 01/16/21 1245    Lab Status: Final result Specimen: Urine, Clean Catch Updated: 01/16/21 1410     Eosinophil Smear 0 % EOS/100 Cells     Blood Culture - Blood, Arm, Left [[id number]] Collected: 01/15/21 1242    Lab Status: Preliminary result Specimen: Blood from Arm, Left Updated: 01/18/21 1302     Blood Culture No growth at 3 days    Respiratory Panel PCR w/COVID-19(SARS-CoV-2) SIMRAN/KEYSHAWN/SKYAL/PAD/COR/MAD/SUDHIR In-House, NP Swab in UTM/VTM, 3-4 HR TAT - Swab, Nasopharynx [[id number]]  (Abnormal) Collected: 01/15/21 1206    Lab Status: Final result Specimen: Swab from Nasopharynx Updated: 01/15/21 1316     ADENOVIRUS, PCR Not Detected     Coronavirus 229E Not Detected     Coronavirus HKU1 Not Detected     Coronavirus NL63 Not Detected     Coronavirus OC43 Not Detected     COVID19 Detected     Human Metapneumovirus Not Detected     Human Rhinovirus/Enterovirus Not Detected     Influenza A PCR Not Detected     Influenza B PCR Not Detected     Parainfluenza Virus 1 Not Detected     Parainfluenza Virus 2 Not Detected     Parainfluenza Virus 3 Not Detected     Parainfluenza Virus 4 Not Detected     RSV, PCR Not Detected     Bordetella pertussis pcr Not Detected     Bordetella parapertussis PCR Not Detected     Chlamydophila pneumoniae PCR Not Detected     Mycoplasma pneumo by PCR Not Detected    Narrative:      Fact sheet for providers: https://docs.[facility]/wp-content/uploads/CXX4192-1130-ZD2.1-EUA-Provider-Fact-Sheet-3.pdf    Fact sheet for patients: https://docs.[facility]/wp-content/uploads/ZBO8852-7576-PP7.1-EUA-Patient-Fact-Sheet-1.pdf    Test performed by PCR.    Blood Culture - Blood, Arm, Right [[id number]] Collected: 01/14/21  1827    Lab Status: Preliminary result Specimen: Blood from Arm, Right Updated: 01/17/21 1845     Blood Culture No growth at 3 days    Blood Culture - Blood, Arm, Left [[id number]] Collected: 01/14/21 1827    Lab Status: Preliminary result Specimen: Blood from Arm, Left Updated: 01/17/21 1845     Blood Culture No growth at 3 days          ECG/EMG Results (most recent)     Procedure Component Value Units Date/Time    ECG 12 Lead [[id number]] Collected: 01/14/21 2155     Updated: 01/15/21 1738     QT Interval 427 ms     Narrative:      HEART RATE= 90  bpm  RR Interval= 667  ms  KS Interval=   ms  P Horizontal Axis=   deg  P Front Axis= 0  deg  QRSD Interval= 134  ms  QT Interval= 427  ms  QRS Axis= -45  deg  T Wave Axis= 157  deg  - ABNORMAL ECG -  Ventricular-paced rhythm  Biventricular paced rhythm  When compared with ECG of 25-Jul-2018 8:56:31,  Significant change in rhythm  Electronically Signed By: Heber Garduno ([facility]) 15-Albert-2021 17:36:17  Date and Time of Study: 2021-01-14 21:55:52                    Xr Chest 1 View    Result Date: 1/18/2021  Interstitial and alveolar disease in both lungs consistent with pneumonia in this Covid positive patient overall appears stable, with slight improved aeration in the right costophrenic angle since the prior examination.  Electronically Signed By-Liyah Damon MD On:1/18/2021 9:19 AM This report was finalized on [id number] by  Liyah Damon MD.    Xr Chest 1 View    Result Date: 1/14/2021  1. There are bilateral airspace opacities which are consistent with pneumonia and/or pulmonary edema. 2. Cardiomegaly.  Electronically Signed By-Kristi Menon MD On:1/14/2021 7:01 PM This report was finalized on [id number] by  Kristi Menon MD.    Us Renal Bilateral    Result Date: 1/15/2021  1. Negative for hydronephrosis. 2. There is moderate right renal cortical thinning. There is mild left renal cortical thinning. 2. Small incidental renal cysts.  Electronically Signed By-Kristi  MD Sinan On:1/15/2021 10:18 PM This report was finalized on [id number] by  Kristi Menon MD.          Xrays, labs reviewed personally by physician.    Medication Review:   I have reviewed the patient's current medication list      Scheduled Meds  [START ON 1/19/2021] allopurinol, 100 mg, Oral, Daily  ascorbic acid, 500 mg, Oral, Q6H  aspirin, 81 mg, Oral, Daily  atorvastatin, 40 mg, Oral, Nightly  carvedilol, 12.5 mg, Oral, Daily With Breakfast & Dinner  cholecalciferol, 1,000 Units, Oral, Daily  dexamethasone, 6 mg, Oral, Daily With Breakfast  doxycycline, 100 mg, Oral, Q12H  famotidine, 20 mg, Oral, Daily  heparin (porcine), 7,500 Units, Subcutaneous, Q12H  insulin glargine, 10 Units, Subcutaneous, Nightly  insulin lispro, 0-9 Units, Subcutaneous, TID AC  isosorbide mononitrate, 30 mg, Oral, Daily  remdesivir, 100 mg, Intravenous, Q24H  sodium chloride, 10 mL, Intravenous, Q12H  thiamine, 100 mg, Oral, Daily  vitamin B-12, 1,000 mcg, Oral, Daily  vitamin B-6, 100 mg, Oral, Daily  zinc sulfate, 440 mg, Oral, Daily        Meds Infusions  Pharmacy Consult - Remdesivir,   Pharmacy Consult - Steroid Insulin Protocol,   Pharmacy to Dose Heparin,         Meds PRN  •  acetaminophen  •  dextrose  •  dextrose  •  glucagon (human recombinant)  •  guaiFENesin-dextromethorphan  •  HYDROcodone-acetaminophen  •  insulin lispro **AND** insulin lispro  •  loperamide  •  LORazepam **OR** LORazepam **OR** LORazepam **OR** LORazepam **OR** LORazepam **OR** LORazepam  •  ondansetron  •  Pharmacy Consult - Remdesivir  •  Pharmacy Consult - Steroid Insulin Protocol  •  Pharmacy to Dose Heparin  •  potassium chloride  •  potassium chloride  •  [COMPLETED] Insert peripheral IV **AND** sodium chloride  •  sodium chloride        Assessment/Plan   Assessment/Plan     Active Hospital Problems    Diagnosis  POA   • CHF (congestive heart failure) (CMS/HCC) [I50.9]  Unknown   • AF (atrial fibrillation) (CMS/[facility]) [I48.91]  Unknown   • AICD  (automatic cardioverter/defibrillator) present [Z95.810]  Unknown   • MI (myocardial infarction) (CMS/[facility]) [I21.9]  Unknown   • Stroke (CMS/[facility]) [I63.9]  Unknown   • Pneumonia due to COVID-19 virus [U07.1, J12.82]  Unknown   • Sepsis (CMS/[facility]) [A41.9]  Yes   • Acute kidney injury superimposed on CKD (CMS/[facility]) [N17.9, N18.9]  Yes      Resolved Hospital Problems   No resolved problems to display.       MEDICAL DECISION MAKING COMPLEXITY BY PROBLEM:     Acute hypoxemic respiratory failure secondary to COVID-19 pneumonia:  -Patient treated with Levaquin, doxycycline, remdesivir and Decadron  -We will get home oxygen eval before discharge  -Respiratory cultures no growth  -MRSA nasal screen negative    SAMIR on CKD stage IIIb:  - hold lisinopril and diuretics  -Consulted Nephrology       Diabetes mellitus type 2  -Continue ISS       VTE Prophylaxis -   Mechanical Order History:      Ordered        01/15/21 1257  Place Sequential Compression Device  Once         01/15/21 1257  Maintain Sequential Compression Device  Continuous                 Pharmalogical Order History:      Ordered     Dose Route Frequency Stop    01/16/21 1426  heparin (porcine) 5000 UNIT/ML injection 7,500 Units      7,500 Units SC Every 12 Hours Scheduled --    01/16/21 1424  Pharmacy to Dose Heparin     Question:  Indication of use  Answer:  VTE Prophylaxis    -- XX Continuous PRN --    01/15/21 0027  dabigatran etexilate (PRADAXA) capsule 150 mg  Status:  Discontinued      150 mg PO 2 Times Daily 01/15/21 1109                  Code Status -   Code Status and Medical Interventions:   Ordered at: 01/14/21 2125     Code Status:    CPR     Medical Interventions (Level of Support Prior to Arrest):    Full       This patient has been examined wearing appropriate Personal Protective Equipment and discussed with nursing. 01/18/21        Discharge Planning  Deferred        Electronically signed by Pantera Wilson DO, 01/18/21, 17:54 BUSHRA Chaney  [facility] Hospitalist Team

## 2021-01-18 NOTE — PROGRESS NOTES
Continued Stay Note  MELLISA Zacarias     Patient Name: Moe Lora  MRN: [id number]  Today's Date: 1/18/2021    Admit Date: 1/14/2021    Discharge Plan     Row Name 01/18/21 1215       Plan    Plan  DC Plan: Return home with family, requires Rm Air ABGs, then 6 min walk for home O2 [facility]. Pt has pulse Ox at home.    Plan Comments  Spouse states pt has pulse Ox, glucometer and thermometer at home. LD of Remdesivir 1/19. Possible DC 1/19.                     Megan Zabala RN

## 2021-01-18 NOTE — NURSING NOTE
Dr. Guardado on unit per MD adjust and titrate oxygen to keep oxygen sat level between 93-95%, pt sats 94% average on 3L NC   Will cont to moniter

## 2021-01-19 ENCOUNTER — READMISSION MANAGEMENT (OUTPATIENT)
Dept: CALL CENTER | Facility: HOSPITAL | Age: 76
End: 2021-01-19

## 2021-01-19 VITALS
WEIGHT: 204.81 LBS | DIASTOLIC BLOOD PRESSURE: 74 MMHG | HEART RATE: 126 BPM | HEIGHT: 60 IN | OXYGEN SATURATION: 93 % | SYSTOLIC BLOOD PRESSURE: 143 MMHG | TEMPERATURE: 97.5 F | BODY MASS INDEX: 40.21 KG/M2 | RESPIRATION RATE: 25 BRPM

## 2021-01-19 LAB
ALBUMIN SERPL-MCNC: 3.2 G/DL (ref 3.5–5.2)
ALBUMIN/GLOB SERPL: 1.1 G/DL
ALP SERPL-CCNC: 243 U/L (ref 39–117)
ALT SERPL W P-5'-P-CCNC: 26 U/L (ref 1–41)
ANION GAP SERPL CALCULATED.3IONS-SCNC: 10 MMOL/L (ref 5–15)
AST SERPL-CCNC: 45 U/L (ref 1–40)
BACTERIA SPEC AEROBE CULT: NORMAL
BACTERIA SPEC AEROBE CULT: NORMAL
BACTERIA SPEC RESP CULT: NORMAL
BASOPHILS # BLD AUTO: 0 10*3/MM3 (ref 0–0.2)
BASOPHILS NFR BLD AUTO: 0.1 % (ref 0–1.5)
BILIRUB SERPL-MCNC: 0.8 MG/DL (ref 0–1.2)
BUN SERPL-MCNC: 50 MG/DL (ref 8–23)
BUN/CREAT SERPL: 39.7 (ref 7–25)
CALCIUM SPEC-SCNC: 9.4 MG/DL (ref 8.6–10.5)
CHLORIDE SERPL-SCNC: 106 MMOL/L (ref 98–107)
CK SERPL-CCNC: 49 U/L (ref 20–200)
CO2 SERPL-SCNC: 21 MMOL/L (ref 22–29)
CREAT SERPL-MCNC: 1.26 MG/DL (ref 0.76–1.27)
CRP SERPL-MCNC: 3.22 MG/DL (ref 0–0.5)
DEPRECATED RDW RBC AUTO: 52.1 FL (ref 37–54)
EOSINOPHIL # BLD AUTO: 0 10*3/MM3 (ref 0–0.4)
EOSINOPHIL NFR BLD AUTO: 0 % (ref 0.3–6.2)
ERYTHROCYTE [DISTWIDTH] IN BLOOD BY AUTOMATED COUNT: 17.1 % (ref 12.3–15.4)
FERRITIN SERPL-MCNC: 1265 NG/ML (ref 30–400)
GFR SERPL CREATININE-BSD FRML MDRD: 56 ML/MIN/1.73
GLOBULIN UR ELPH-MCNC: 2.8 GM/DL
GLUCOSE BLDC GLUCOMTR-MCNC: 156 MG/DL (ref 70–105)
GLUCOSE BLDC GLUCOMTR-MCNC: 168 MG/DL (ref 70–105)
GLUCOSE SERPL-MCNC: 163 MG/DL (ref 65–99)
GRAM STN SPEC: NORMAL
GRAM STN SPEC: NORMAL
HCT VFR BLD AUTO: 32.4 % (ref 37.5–51)
HGB BLD-MCNC: 10.8 G/DL (ref 13–17.7)
LYMPHOCYTES # BLD AUTO: 0.3 10*3/MM3 (ref 0.7–3.1)
LYMPHOCYTES NFR BLD AUTO: 4.9 % (ref 19.6–45.3)
MAGNESIUM SERPL-MCNC: 2.2 MG/DL (ref 1.6–2.4)
MCH RBC QN AUTO: 29.1 PG (ref 26.6–33)
MCHC RBC AUTO-ENTMCNC: 33.3 G/DL (ref 31.5–35.7)
MCV RBC AUTO: 87.4 FL (ref 79–97)
MONOCYTES # BLD AUTO: 0.2 10*3/MM3 (ref 0.1–0.9)
MONOCYTES NFR BLD AUTO: 3.7 % (ref 5–12)
NEUTROPHILS NFR BLD AUTO: 4.6 10*3/MM3 (ref 1.7–7)
NEUTROPHILS NFR BLD AUTO: 91.3 % (ref 42.7–76)
NRBC BLD AUTO-RTO: 0.1 /100 WBC (ref 0–0.2)
PHOSPHATE SERPL-MCNC: 2.8 MG/DL (ref 2.5–4.5)
PLATELET # BLD AUTO: 76 10*3/MM3 (ref 140–450)
PMV BLD AUTO: 9.6 FL (ref 6–12)
POTASSIUM SERPL-SCNC: 3.9 MMOL/L (ref 3.5–5.2)
PROT SERPL-MCNC: 6 G/DL (ref 6–8.5)
RBC # BLD AUTO: 3.71 10*6/MM3 (ref 4.14–5.8)
SODIUM SERPL-SCNC: 137 MMOL/L (ref 136–145)
WBC # BLD AUTO: 5.1 10*3/MM3 (ref 3.4–10.8)

## 2021-01-19 PROCEDURE — 99238 HOSP IP/OBS DSCHRG MGMT 30/<: CPT | Performed by: HOSPITALIST

## 2021-01-19 PROCEDURE — 82962 GLUCOSE BLOOD TEST: CPT

## 2021-01-19 PROCEDURE — 84100 ASSAY OF PHOSPHORUS: CPT | Performed by: INTERNAL MEDICINE

## 2021-01-19 PROCEDURE — 82550 ASSAY OF CK (CPK): CPT | Performed by: INTERNAL MEDICINE

## 2021-01-19 PROCEDURE — 86140 C-REACTIVE PROTEIN: CPT | Performed by: INTERNAL MEDICINE

## 2021-01-19 PROCEDURE — 80053 COMPREHEN METABOLIC PANEL: CPT | Performed by: INTERNAL MEDICINE

## 2021-01-19 PROCEDURE — 25010000002 HEPARIN (PORCINE) PER 1000 UNITS: Performed by: INTERNAL MEDICINE

## 2021-01-19 PROCEDURE — 83735 ASSAY OF MAGNESIUM: CPT | Performed by: INTERNAL MEDICINE

## 2021-01-19 PROCEDURE — 85025 COMPLETE CBC W/AUTO DIFF WBC: CPT | Performed by: INTERNAL MEDICINE

## 2021-01-19 PROCEDURE — 82728 ASSAY OF FERRITIN: CPT | Performed by: INTERNAL MEDICINE

## 2021-01-19 PROCEDURE — 63710000001 INSULIN LISPRO (HUMAN) PER 5 UNITS: Performed by: INTERNAL MEDICINE

## 2021-01-19 RX ORDER — HYDROCODONE BITARTRATE AND ACETAMINOPHEN 7.5; 325 MG/1; MG/1
1 TABLET ORAL EVERY 6 HOURS PRN
Qty: 15 TABLET | Refills: 0 | Status: SHIPPED | OUTPATIENT
Start: 2021-01-19 | End: 2021-01-21 | Stop reason: HOSPADM

## 2021-01-19 RX ORDER — DEXAMETHASONE 6 MG/1
6 TABLET ORAL
Qty: 5 TABLET | Refills: 0 | Status: SHIPPED | OUTPATIENT
Start: 2021-01-20 | End: 2021-01-25

## 2021-01-19 RX ORDER — ASPIRIN 81 MG/1
81 TABLET, CHEWABLE ORAL DAILY
Start: 2021-01-19

## 2021-01-19 RX ORDER — SODIUM BICARBONATE 650 MG/1
1300 TABLET ORAL ONCE
Status: COMPLETED | OUTPATIENT
Start: 2021-01-19 | End: 2021-01-19

## 2021-01-19 RX ORDER — DOXYCYCLINE 100 MG/1
100 TABLET ORAL EVERY 12 HOURS SCHEDULED
Qty: 1 TABLET | Refills: 0 | Status: SHIPPED | OUTPATIENT
Start: 2021-01-19 | End: 2021-01-21 | Stop reason: HOSPADM

## 2021-01-19 RX ADMIN — ALLOPURINOL 100 MG: 100 TABLET ORAL at 08:46

## 2021-01-19 RX ADMIN — Medication 1000 UNITS: at 08:46

## 2021-01-19 RX ADMIN — CARVEDILOL 12.5 MG: 6.25 TABLET, FILM COATED ORAL at 08:47

## 2021-01-19 RX ADMIN — Medication 10 ML: at 08:48

## 2021-01-19 RX ADMIN — INSULIN LISPRO 2 UNITS: 100 INJECTION, SOLUTION INTRAVENOUS; SUBCUTANEOUS at 13:31

## 2021-01-19 RX ADMIN — HYDROCODONE BITARTRATE AND ACETAMINOPHEN 1 TABLET: 7.5; 325 TABLET ORAL at 08:46

## 2021-01-19 RX ADMIN — POLYETHYLENE GLYCOL 3350 17 G: 17 POWDER, FOR SOLUTION ORAL at 08:48

## 2021-01-19 RX ADMIN — FAMOTIDINE 20 MG: 20 TABLET, FILM COATED ORAL at 08:46

## 2021-01-19 RX ADMIN — Medication 100 MG: at 08:46

## 2021-01-19 RX ADMIN — INSULIN LISPRO 2 UNITS: 100 INJECTION, SOLUTION INTRAVENOUS; SUBCUTANEOUS at 08:47

## 2021-01-19 RX ADMIN — ASPIRIN 81 MG: 81 TABLET, CHEWABLE ORAL at 08:46

## 2021-01-19 RX ADMIN — DOXYCYCLINE 100 MG: 100 TABLET, FILM COATED ORAL at 08:46

## 2021-01-19 RX ADMIN — CYANOCOBALAMIN TAB 1000 MCG 1000 MCG: 1000 TAB at 08:46

## 2021-01-19 RX ADMIN — DEXAMETHASONE 6 MG: 4 TABLET ORAL at 08:46

## 2021-01-19 RX ADMIN — HEPARIN SODIUM 7500 UNITS: 5000 INJECTION INTRAVENOUS; SUBCUTANEOUS at 08:47

## 2021-01-19 RX ADMIN — SODIUM BICARBONATE 650 MG TABLET 1300 MG: at 08:47

## 2021-01-19 RX ADMIN — ZINC SULFATE 220 MG (50 MG) CAPSULE 440 MG: CAPSULE at 08:46

## 2021-01-19 RX ADMIN — ISOSORBIDE MONONITRATE 30 MG: 30 TABLET, EXTENDED RELEASE ORAL at 08:46

## 2021-01-19 NOTE — NURSING NOTE
"Pt refused vitamin c this am. Pt was agitated when I told him I had his vitamin.  Pt states \"it is an acid, and burns all the way down to his stomach.\" Pt educated on medication. Pt voiced understanding but still refused.   "

## 2021-01-19 NOTE — DISCHARGE SUMMARY
[facility]  DISCHARGE SUMMARY        Prepared For PCP:  Provider, No Known    Patient Name: Moe Lora  : 1945  MRN: [id number]      Date of Admission:   2021    Date of Discharge:  2021    Length of stay:  LOS: 1 day     Hospital Course     Presenting Problem:   Sepsis, due to unspecified organism, unspecified whether acute organ dysfunction present (CMS/[facility]) [A41.9]  Pneumonia due to COVID-19 virus [U07.1, J12.82]  Sepsis, due to unspecified organism, unspecified whether acute organ dysfunction present (CMS/[facility]) [A41.9]      Active Hospital Problems    Diagnosis  POA   • CHF (congestive heart failure) (CMS/[facility]) [I50.9]  Unknown   • AF (atrial fibrillation) (CMS/[facility]) [I48.91]  Unknown   • AICD (automatic cardioverter/defibrillator) present [Z95.810]  Unknown   • MI (myocardial infarction) (CMS/[facility]) [I21.9]  Unknown   • Stroke (CMS/[facility]) [I63.9]  Unknown   • Pneumonia due to COVID-19 virus [U07.1, J12.82]  Unknown   • Sepsis (CMS/[facility]) [A41.9]  Yes   • Acute kidney injury superimposed on CKD (CMS/[facility]) [N17.9, N18.9]  Yes      Resolved Hospital Problems   No resolved problems to display.           Hospital Course:    The patient was hospitalized for acute hypoxemic respiratory failure secondary to COVID-19 pneumonia and prerenal SAMIR.  He was evaluated by nephrologist and SAMIR was thought to be prerenal.  Lasix and losartan were held and renal function improved.  The patient has received 5 days of remdesivir and has been treated with Levaquin, doxycycline and Decadron.  Sputum culture is negative for growth  MRSA nares screen is negative.  Blood cultures have no growth so far.  The patient has undergone ABG and walking oximetry and has qualified for home oxygen.  He will be discharged home on 2021 with home oxygen.  He will need to follow-up with his PCP in 2 weeks for further care.  He will hold Lasix for 1 week and losartan has been discontinued.   The patient has history atrial fibrillation and refused anticoagulation because of past history of head bleed from Coumadin use and anaphylaxis with Plavix use.  [facility] to [facility] with [facility] for further care.        Problem list during hospitalization:    Acute hypoxemic respiratory failure secondary to COVID-19 pneumonia:  -Patient treated with Levaquin, doxycycline, remdesivir and Decadron  -Qualified for home oxygen   -Respiratory cultures no growth  -MRSA nasal screen negative  -Blood culture negative for growth  -Discharge home 1/19/2021 Decadron  -Follow-up with PCP in 2 weeks     Prerenal SAMIR on CKD stage IIIb:  -Losartan and Lasix held during admission  -Discontinued losartan on discharge  -Hold Lasix for 7 days on discharge  -Consulted Nephrology  -Follow-up with PCP in 2 weeks for further management        Diabetes mellitus type 2  -Not on medications currently  -Follow-up with PCP in 2 weeks for further management      Atrial fibrillation:  -Claims no anticoagulation because of history of head bleed when taking warfarin and anaphylaxis when taking Plavix             Recommendation for Outpatient Providers:             Reasons For Change In Medications and Indications for New Medications:        Day of Discharge     HPI:     The patient is a 75-year-old male that presented to the emergency room on 1/14/2021 complaining of several days of fevers, productive cough, nausea and shortness of air.  The patient was diagnosed with COVID-19 about 8 days before coming to the ED.     In the ED, the patient had fever 103.1F.  ABG showed pH of 7.549, PCO2 35, PO2 68.5 and O2 saturation 95.6.  Chest x-ray showed pneumonia.          Vital Signs:         Physical Exam:  Physical Exam  HENT:      Head: Normocephalic.      Nose: Nose normal.   Eyes:      General: No scleral icterus.     Extraocular Movements: Extraocular movements intact.      Pupils: Pupils are equal,  round, and reactive to light.   Neck:      Musculoskeletal: Normal range of motion.   Cardiovascular:      Rate and Rhythm: Rhythm irregularly irregular.   Pulmonary:      Effort: Pulmonary effort is normal.      Breath sounds: Normal breath sounds.   Abdominal:      General: Bowel sounds are normal.      Palpations: Abdomen is soft.   Musculoskeletal: Normal range of motion.   Skin:     General: Skin is warm.   Neurological:      Mental Status: He is alert.   Psychiatric:         Mood and Affect: Mood normal.         Pertinent  and/or Most Recent Results     Results from last 7 days   Lab Units 01/19/21  0305 01/18/21  0609 01/17/21  0346 01/16/21  0350 01/14/21  1825   WBC 10*3/mm3 5.10 7.40 4.00 6.70 6.90   HEMOGLOBIN g/dL 10.8* 11.9* 11.9* 12.4* 12.1*   HEMATOCRIT % 32.4* 35.1* 36.1* 38.4 37.3*   PLATELETS 10*3/mm3 76* 92* 118* 119* 128*   SODIUM mmol/L 137 138 136 136 137   POTASSIUM mmol/L 3.9 3.6 3.6 3.1* 3.7   CHLORIDE mmol/L 106 104 100 95* 93*   CO2 mmol/L 21.0* 23.0 28.0 26.0 33.0*   BUN mg/dL 50* 38* 49* 51* 36*   CREATININE mg/dL 1.26 1.36* 1.57* 1.97* 1.79*   GLUCOSE mg/dL 163* 132* 187* 149* 189*   CALCIUM mg/dL 9.4 9.2 9.4 9.2 9.6     Results from last 7 days   Lab Units 01/19/21  0305 01/18/21  0609 01/17/21 0346 01/16/21  0350 01/15/21  0038   BILIRUBIN mg/dL 0.8 0.7 0.8 1.1 1.1   ALK PHOS U/L 243* 240* 266* 289* 278*   ALT (SGPT) U/L 26 24 20 19 19   AST (SGOT) U/L 45* 48* 45* 34 26           Invalid input(s): TG, LDLCALC, LDLREALC  Results from last 7 days   Lab Units 01/15/21  0038 01/14/21 2004 01/14/21  1825   TSH uIU/mL 0.339  --   --    PROBNP pg/mL  --   --  19,182.0*   PROCALCITONIN ng/mL  --   --  0.29*   LACTATE mmol/L  --  1.9  --        Brief Urine Lab Results  (Last result in the past 365 days)      Color   Clarity   Blood   Leuk Est   Nitrite   Protein   CREAT   Urine HCG        01/16/21 1247 Yellow  Comment:  Result checked  Clear  Comment:  Result checked  Negative Negative  Negative Trace               Microbiology Results Abnormal     Procedure Component Value - Date/Time    Blood Culture - Blood, Arm, Left [[id number]] Collected: 01/15/21 1242    Lab Status: Final result Specimen: Blood from Arm, Left Updated: 01/20/21 1301     Blood Culture No growth at 5 days    Blood Culture - Blood, Arm, Right [[id number]] Collected: 01/14/21 1827    Lab Status: Final result Specimen: Blood from Arm, Right Updated: 01/19/21 1845     Blood Culture No growth at 5 days    Blood Culture - Blood, Arm, Left [[id number]] Collected: 01/14/21 1827    Lab Status: Final result Specimen: Blood from Arm, Left Updated: 01/19/21 1845     Blood Culture No growth at 5 days    Respiratory Culture - Sputum, Cough [[id number]] Collected: 01/17/21 1731    Lab Status: Final result Specimen: Sputum from Cough Updated: 01/19/21 0719     Respiratory Culture Rare Normal Respiratory Caroline: NO S.aureus/MRSA or Pseudomonas aeruginosa     Gram Stain Rare (1+) WBCs per low power field      Rare (1+) Mixed bacterial morphotypes seen on Gram Stain    Eosinophil Smear - Urine, Urine, Clean Catch [[id number]]  (Normal) Collected: 01/16/21 1245    Lab Status: Final result Specimen: Urine, Clean Catch Updated: 01/16/21 1410     Eosinophil Smear 0 % EOS/100 Cells     S. Pneumo Ag Urine or CSF - Urine, Urine, Clean Catch [[id number]]  (Normal) Collected: 01/16/21 1245    Lab Status: Final result Specimen: Urine, Clean Catch Updated: 01/16/21 1348     Strep Pneumo Ag Negative    Legionella Antigen, Urine - Urine, Urine, Clean Catch [[id number]]  (Normal) Collected: 01/16/21 1245    Lab Status: Final result Specimen: Urine, Clean Catch Updated: 01/16/21 1348     LEGIONELLA ANTIGEN, URINE Negative    Clostridium Difficile EIA - Stool, Per Rectum [[id number]]  (Normal) Collected: 01/16/21 1246    Lab Status: Final result Specimen: Stool from Per Rectum Updated: 01/16/21 1332     C Diff GDH / Toxin Negative    Respiratory Panel PCR  w/COVID-19(SARS-CoV-2) SIMRAN/KEYSHAWN/SKYLA/PAD/COR/MAD/SUDHIR In-House, NP Swab in UTM/VTM, 3-4 HR TAT - Swab, Nasopharynx [[id number]]  (Abnormal) Collected: 01/15/21 1206    Lab Status: Final result Specimen: Swab from Nasopharynx Updated: 01/15/21 1316     ADENOVIRUS, PCR Not Detected     Coronavirus 229E Not Detected     Coronavirus HKU1 Not Detected     Coronavirus NL63 Not Detected     Coronavirus OC43 Not Detected     COVID19 Detected     Human Metapneumovirus Not Detected     Human Rhinovirus/Enterovirus Not Detected     Influenza A PCR Not Detected     Influenza B PCR Not Detected     Parainfluenza Virus 1 Not Detected     Parainfluenza Virus 2 Not Detected     Parainfluenza Virus 3 Not Detected     Parainfluenza Virus 4 Not Detected     RSV, PCR Not Detected     Bordetella pertussis pcr Not Detected     Bordetella parapertussis PCR Not Detected     Chlamydophila pneumoniae PCR Not Detected     Mycoplasma pneumo by PCR Not Detected    Narrative:      Fact sheet for providers: https://docs.[facility]/wp-content/uploads/HKI5974-6598-XY2.1-EUA-Provider-Fact-Sheet-3.pdf    Fact sheet for patients: https://docs.[facility]/wp-content/uploads/SCA9942-5105-DJ4.1-EUA-Patient-Fact-Sheet-1.pdf    Test performed by PCR.          Xr Chest 1 View    Result Date: 1/18/2021  Impression: Interstitial and alveolar disease in both lungs consistent with pneumonia in this Covid positive patient overall appears stable, with slight improved aeration in the right costophrenic angle since the prior examination.  Electronically Signed By-Liyah Damon MD On:1/18/2021 9:19 AM This report was finalized on [id number] by  Liyah Damon MD.    Xr Chest 1 View    Result Date: 1/14/2021  Impression: 1. There are bilateral airspace opacities which are consistent with pneumonia and/or pulmonary edema. 2. Cardiomegaly.  Electronically Signed By-Kristi Menon MD On:1/14/2021 7:01 PM This report was finalized on [id number] by  Kristi Menon  MD.    Us Renal Bilateral    Result Date: 1/15/2021  Impression: 1. Negative for hydronephrosis. 2. There is moderate right renal cortical thinning. There is mild left renal cortical thinning. 2. Small incidental renal cysts.  Electronically Signed By-Kristi Menon MD On:1/15/2021 10:18 PM This report was finalized on [id number] by  Kristi Menon MD.                             Test Results Pending at Discharge        Procedures Performed           Consults:   Consults     Date and Time Order Name Status Description    1/15/2021 1058 Inpatient Nephrology Consult Completed     1/14/2021 1927 Hospitalist (on-call MD unless specified) Completed             Discharge Details        Discharge Medications      New Medications      Instructions Start Date   cyanocobalamin 1000 MCG tablet  Commonly known as: VITAMIN B-12   1,000 mcg, Oral, Daily      dexamethasone 6 MG tablet  Commonly known as: DECADRON   6 mg, Oral, Daily With Breakfast      thiamine 100 MG tablet  tablet  Commonly known as: VITAMIN B-1   100 mg, Oral, Daily         Continue These Medications      Instructions Start Date   albuterol sulfate  (90 Base) MCG/ACT inhaler  Commonly known as: PROVENTIL HFA;VENTOLIN HFA;PROAIR HFA   2 puffs, Inhalation, Every 4 Hours PRN      allopurinol 300 MG tablet  Commonly known as: ZYLOPRIM   300 mg, Oral, Daily      Alogliptin Benzoate 6.25 MG tablet   1 tablet, Oral, Daily      aspirin 81 MG EC tablet   81 mg, Oral, Daily      aspirin 81 MG chewable tablet   81 mg, Oral, Daily, Stopped preop, last dose 7/18/18       atorvastatin 40 MG tablet  Commonly known as: LIPITOR   40 mg, Oral, Nightly      carvedilol 12.5 MG tablet  Commonly known as: COREG   37.5 mg, Oral, Every 12 Hours, [facility] 12.5mg tablets BID      furosemide 80 MG tablet  Commonly known as: LASIX   160 mg, Oral, 2 Times Daily, Per [facility] Furosemide 80mg take 2 tablets BID      isosorbide mononitrate 60 MG 24 hr tablet  Commonly known as:  IMDUR   60 mg, Oral, Daily      omeprazole 40 MG capsule  Commonly known as: priLOSEC   40 mg, Oral, Daily      tiotropium 18 MCG per inhalation capsule  Commonly known as: SPIRIVA   1 capsule, Inhalation, Daily - RT         Stop These Medications    HYDROcodone-acetaminophen 7.5-325 MG per tablet  Commonly known as: Norco     losartan 50 MG tablet  Commonly known as: COZAAR            Allergies   Allergen Reactions   • Azithromycin Dermatitis   • Clopidogrel Anaphylaxis   • Codeine Dermatitis   • Cephalexin Dermatitis     Pt unsure of this allergy         Discharge Disposition:  Home or Self Care    Diet:  Hospital:  No active diet order        Discharge Activity: As tolerated        CODE STATUS:    Code Status and Medical Interventions:   Ordered at: 01/14/21 2125     Code Status:    CPR     Medical Interventions (Level of Support Prior to Arrest):    Full         Follow-up Appointments  No future appointments.    Additional Instructions for the Follow-ups that You Need to Schedule     Ambulatory Referral to Cardiac Rehab   As directed      Discharge Follow-up with PCP   As directed       Currently Documented PCP:    Provider, No Known    PCP Phone Number:    None     Follow Up Details: 2 weeks         Discharge Follow-up with PCP   As directed       Currently Documented PCP:    Provider, No Known    PCP Phone Number:    None     Follow Up Details: 2 weeks                 Condition on Discharge:      Stable      This patient has been examined wearing appropriate Personal Protective Equipment and discussed with nursing. 01/21/21      Electronically signed by Pantera Wilson DO, 01/19/21, 1:55 PM EST.      Time: I spent  15 minutes on this discharge activity which included face-to-face encounter with the patient/reviewing the data in the system/coordination of the care with the nursing staff as well as consultants/documentation/entering orders.

## 2021-01-19 NOTE — PLAN OF CARE
Goal Outcome Evaluation:  Plan of Care Reviewed With: patient  Progress: improving  Outcome Summary: pt rested comfortably throughout night, pt still remains on 2L o2 via NC, pt to d/c today 01/19 after last dose of remdesivir, pt voiced no other complaints, will continue to monitor.

## 2021-01-19 NOTE — PROGRESS NOTES
Continued Stay Note   Rell     Patient Name: Moe Lora  MRN: [id number]  Today's Date: 1/19/2021    Admit Date: 1/14/2021    Discharge Plan     Row Name 01/19/21 0800       Plan    Plan  DC Plan: Home w/family. [facility]. Possible anticoagulent at DC.      Chart review only.             Megan Zabala RN

## 2021-01-19 NOTE — PROGRESS NOTES
"NEPHROLOGY PROGRESS NOTE------[facility]    [facility]  [phone]  Anastacia Gallegos MD      Patient Care Team:  Provider, No Known as PCP - Felicitas Poole MD as Consulting Physician (Nephrology)      Provider:  Anastacia Gallegos MD  Patient Name: Moe Lora  :  1945    SUBJECTIVE:    F/U ARF/SAMIR/CRF/CKD STG 3    No major new events. No dysuria. No palpitations.     Medication:  allopurinol, 100 mg, Oral, Daily  ascorbic acid, 500 mg, Oral, Q6H  aspirin, 81 mg, Oral, Daily  atorvastatin, 40 mg, Oral, Nightly  carvedilol, 12.5 mg, Oral, Daily With Breakfast & Dinner  cholecalciferol, 1,000 Units, Oral, Daily  dexamethasone, 6 mg, Oral, Daily With Breakfast  doxycycline, 100 mg, Oral, Q12H  famotidine, 20 mg, Oral, Daily  heparin (porcine), 7,500 Units, Subcutaneous, Q12H  insulin glargine, 10 Units, Subcutaneous, Nightly  insulin lispro, 0-9 Units, Subcutaneous, TID AC  isosorbide mononitrate, 30 mg, Oral, Daily  polyethylene glycol, 17 g, Oral, Daily  remdesivir, 100 mg, Intravenous, Q24H  sodium chloride, 10 mL, Intravenous, Q12H  thiamine, 100 mg, Oral, Daily  vitamin B-12, 1,000 mcg, Oral, Daily  vitamin B-6, 100 mg, Oral, Daily  zinc sulfate, 440 mg, Oral, Daily      Pharmacy Consult - Remdesivir,   Pharmacy Consult - Steroid Insulin Protocol,   Pharmacy to Dose Heparin,         OBJECTIVE    Vital Sign Min/Max for last 24 hours  Temp  Min: 97 °F (36.1 °C)  Max: 103.1 °F (39.5 °C)   BP  Min: 121/67  Max: 138/87   Pulse  Min: 100  Max: 119   Resp  Min: 14  Max: 22   SpO2  Min: 88 %  Max: 98 %   No data recorded   Weight  Min: 92.9 kg (204 lb 12.9 oz)  Max: 92.9 kg (204 lb 12.9 oz)     Flowsheet Rows      First Filed Value   Admission Height  137.2 cm (54\") Documented at 2021   Admission Weight  79.4 kg (175 lb) Documented at 2021          No intake/output data recorded.  I/O last 3 completed " shifts:  In: 1430 [P.O.:1430]  Out: 1400 [Urine:1400]    Physical Exam:  General Appearance: alert, appears stated age and cooperative  Head: normocephalic, without obvious abnormality and atraumatic  Eyes: conjunctivae and sclerae normal and no icterus  Neck: supple and no JVD  Lungs: +FEW SCATTERED RHONCHI  Heart: regular rhythm & normal rate and normal S1, S2 +PARUL  Chest: Wall no abnormalities observed  Abdomen: normal bowel sounds and soft non-tender  Extremities: moves extremities well, no edema, no cyanosis and no redness  Skin: no bleeding, bruising or rash, turgor normal, color normal and no lesions noted  Neurologic: Alert, and oriented. No focal deficits    Labs:    WBC WBC   Date Value Ref Range Status   01/19/2021 5.10 3.40 - 10.80 10*3/mm3 Final   01/18/2021 7.40 3.40 - 10.80 10*3/mm3 Final   01/17/2021 4.00 3.40 - 10.80 10*3/mm3 Final      HGB Hemoglobin   Date Value Ref Range Status   01/19/2021 10.8 (L) 13.0 - 17.7 g/dL Final   01/18/2021 11.9 (L) 13.0 - 17.7 g/dL Final   01/17/2021 11.9 (L) 13.0 - 17.7 g/dL Final      HCT Hematocrit   Date Value Ref Range Status   01/19/2021 32.4 (L) 37.5 - 51.0 % Final   01/18/2021 35.1 (L) 37.5 - 51.0 % Final   01/17/2021 36.1 (L) 37.5 - 51.0 % Final      Platlets No results found for: LABPLAT   MCV MCV   Date Value Ref Range Status   01/19/2021 87.4 79.0 - 97.0 fL Final   01/18/2021 87.9 79.0 - 97.0 fL Final   01/17/2021 87.4 79.0 - 97.0 fL Final          Sodium Sodium   Date Value Ref Range Status   01/19/2021 137 136 - 145 mmol/L Final   01/18/2021 138 136 - 145 mmol/L Final   01/17/2021 136 136 - 145 mmol/L Final      Potassium Potassium   Date Value Ref Range Status   01/19/2021 3.9 3.5 - 5.2 mmol/L Final   01/18/2021 3.6 3.5 - 5.2 mmol/L Final   01/17/2021 3.6 3.5 - 5.2 mmol/L Final      Chloride Chloride   Date Value Ref Range Status   01/19/2021 106 98 - 107 mmol/L Final   01/18/2021 104 98 - 107 mmol/L Final   01/17/2021 100 98 - 107 mmol/L Final      CO2  CO2   Date Value Ref Range Status   01/19/2021 21.0 (L) 22.0 - 29.0 mmol/L Final   01/18/2021 23.0 22.0 - 29.0 mmol/L Final   01/17/2021 28.0 22.0 - 29.0 mmol/L Final      BUN BUN   Date Value Ref Range Status   01/19/2021 50 (H) 8 - 23 mg/dL Final   01/18/2021 38 (H) 8 - 23 mg/dL Final   01/17/2021 49 (H) 8 - 23 mg/dL Final      Creatinine Creatinine   Date Value Ref Range Status   01/19/2021 1.26 0.76 - 1.27 mg/dL Final   01/18/2021 1.36 (H) 0.76 - 1.27 mg/dL Final   01/17/2021 1.57 (H) 0.76 - 1.27 mg/dL Final      Calcium Calcium   Date Value Ref Range Status   01/19/2021 9.4 8.6 - 10.5 mg/dL Final   01/18/2021 9.2 8.6 - 10.5 mg/dL Final   01/17/2021 9.4 8.6 - 10.5 mg/dL Final      PO4 No components found for: PO4   Albumin Albumin   Date Value Ref Range Status   01/19/2021 3.20 (L) 3.50 - 5.20 g/dL Final   01/18/2021 3.50 3.50 - 5.20 g/dL Final   01/17/2021 3.40 (L) 3.50 - 5.20 g/dL Final      Magnesium Magnesium   Date Value Ref Range Status   01/19/2021 2.2 1.6 - 2.4 mg/dL Final   01/18/2021 2.3 1.6 - 2.4 mg/dL Final   01/17/2021 2.3 1.6 - 2.4 mg/dL Final      Uric Acid No components found for: URIC ACID     Imaging Results (Last 72 Hours)     Procedure Component Value Units Date/Time    XR Chest 1 View [[id number]] Collected: 01/18/21 0917     Updated: 01/18/21 0922    Narrative:      DATE OF EXAM:  1/18/2021 9:03 AM     PROCEDURE:  XR CHEST 1 VW-     INDICATIONS:  soa; A41.9-Sepsis, unspecified organism; U07.1-COVID-19;  J12.82-Pneumonia due to Coronavirus disease 2019       COMPARISON:  AP portable chest 01/14/2021.     TECHNIQUE:   Single radiographic view of the chest was obtained.     FINDINGS:  Interstitial and alveolar opacities in the mid to lower lung zones,  again noted, with slight improved aeration in the right costophrenic  angle.. Probable trace right basilar pleural effusion. Stable  cardiomegaly with CABG pacemaker device unchanged. No pneumothorax or  acute osseous abnormalities.        Impression:      Interstitial and alveolar disease in both lungs consistent with  pneumonia in this Covid positive patient overall appears stable, with  slight improved aeration in the right costophrenic angle since the prior  examination.     Electronically Signed By-Liyah Damon MD On:1/18/2021 9:19 AM  This report was finalized on [id number] by  Liyah Damon MD.          Results for orders placed during the hospital encounter of 01/14/21   XR Chest 1 View    Narrative DATE OF EXAM:  1/18/2021 9:03 AM     PROCEDURE:  XR CHEST 1 VW-     INDICATIONS:  soa; A41.9-Sepsis, unspecified organism; U07.1-COVID-19;  J12.82-Pneumonia due to Coronavirus disease 2019       COMPARISON:  AP portable chest 01/14/2021.     TECHNIQUE:   Single radiographic view of the chest was obtained.     FINDINGS:  Interstitial and alveolar opacities in the mid to lower lung zones,  again noted, with slight improved aeration in the right costophrenic  angle.. Probable trace right basilar pleural effusion. Stable  cardiomegaly with CABG pacemaker device unchanged. No pneumothorax or  acute osseous abnormalities.       Impression Interstitial and alveolar disease in both lungs consistent with  pneumonia in this Covid positive patient overall appears stable, with  slight improved aeration in the right costophrenic angle since the prior  examination.     Electronically Signed By-Liyah Damon MD On:1/18/2021 9:19 AM  This report was finalized on [id number] by  Liyah Damon MD.   XR Chest 1 View    Narrative Examination: XR CHEST 1 VW-     Date of Exam: 1/14/2021 6:36 PM     Indication: Dyspnea.       Comparison: None available.     Technique: 1 view of the chest      FINDINGS:  There is cardiomegaly. There has been a coronary artery bypass. There  are 3 pacer leads in the heart. There our moderate airspace opacities in  the left mid-lower lung and in the right lower lung. Right pleural  effusion is difficult to exclude. Negative for  pneumothorax. No bone  abnormality noted.       Impression 1. There are bilateral airspace opacities which are consistent with  pneumonia and/or pulmonary edema.  2. Cardiomegaly.     Electronically Signed By-Kristi Menon MD On:1/14/2021 7:01 PM  This report was finalized on [id number] by  Kristi Menon MD.              ASSESSMENT / PLAN      Sepsis (CMS/[facility])    Acute kidney injury superimposed on CKD (CMS/[facility])    CHF (congestive heart failure) (CMS/[facility])    AF (atrial fibrillation) (CMS/[facility])    AICD (automatic cardioverter/defibrillator) present    MI (myocardial infarction) (CMS/[facility])    Stroke (CMS/[facility])    Pneumonia due to COVID-19 virus    1. ARF/SAMIR/CRF/CKD STG 3-------Nonoliguric. BUN/Cr down. +ARF/SAMIR on top of known CRF/CKD STG 3A with what appears to be a baseline creatinine of 1.3 based on review of old labs. Unknown if patient has baseline proteinuria or hematuria. CRF/CKD STG 3A is likely secondary to DGS vs. HTN NS. +ARF/SAMIR is secondary to prerenal state/intravascular volume depletion with concomitant Lasix use (BNP is clinically inaccurate) and hemodynamic fluctuation from decompensated Pulmonary state. Continue to hold Lasix. Off of IVFs. No NSAIDs or IV dye. D/C prn Ibuprofen. Dose meds for CrCl less than 10 cc/min until ARF/SAMIR is resolved.      2. COVID 19 + PNA-------Blood cx pending. S/P Remdesivir. Per PCP/Pulmonary. On Zinc, Vitamin D, Vitamin B1/B-12/B-6. On Levaquin also     3. ANEMIA------- H/H stable     4. METABOLIC ACIDOSIS------PO NaHCO3 x one     5. OA/DJD------No NSAIDs. On Allopurinol     6. BPH AND H/O NEPHROLITHIASIS-------PVR okay     7. H/O CHF/ISCHEMIC CM-------Currently without gross overload clinically despite elevated BNP. Hold Lasix and hydrate GENTLY for reasons stated above     8. DMII-------BS okay. Glucometers, SSI     9. GERD/PUD PROPHYLAXIS------Renal dose adjusted Pepcid     10. HYPERLIPIDEMIA-----On Statin.       11. ELEVATED D-DIMER/DVT PROPHYLAXIS-----SCD b/c of  thrombocytopenia (Per PCP)     12. THROMBOCYTOPENIA     13. HYPOCALCEMIA------Replaced    14. HYPOKALEMIA------Replaced          Anastacia Gallegos MD  [facility]  [phone]  01/19/21  07:04 EST

## 2021-01-19 NOTE — NURSING NOTE
Assess pt when CNA inform of elevated temp, pt skin cool , no flush appearance , pt denies pain aches or feeling bad, use Dynamic to recheck oral temp 97.3. ensure pt didn't have any hot or cool beverage that could effect temp. Will cont to monitor

## 2021-01-19 NOTE — DISCHARGE PLACEMENT REQUEST
"Moe Villarreal (75 y.o. Male)     Date of Birth Social Security Number Address Home Phone MRN    1945  [address] [address] [phone] [id number]    Alevism Marital Status          Mormonism        Admission Date Admission Type Admitting Provider Attending Provider Department, Room/Bed    1/14/21 Emergency Pantera Wilson, Pantera Lau DO [address] PEDIATRICS, 205/1    Discharge Date Discharge Disposition Discharge Destination                       Attending Provider: Pantera Wilson DO    Allergies: Azithromycin, Clopidogrel, Codeine, Cephalexin    Isolation: Enh Drop/Con   Infection: MRSA/History Only (07/25/18), COVID (confirmed) (01/15/21)   Code Status: CPR    Ht: 137.2 cm (54\")   Wt: 92.9 kg (204 lb 12.9 oz)    Admission Cmt: None   Principal Problem: None                Active Insurance as of 1/14/2021     Primary Coverage     Payor Plan Insurance Group Employer/Plan Group    [id number]     Payor Plan Address Payor Plan Phone Number Payor Plan Fax Number Effective Dates    [facility] OFFICE OF COMMUNITY CARE [phone]  1/1/2018 - None Entered    [address] [address]       [facility] [id number]       Subscriber Name Subscriber Birth Date Member ID       MOE VILLARREAL 1945 [id number]                 Emergency Contacts      (Rel.) Home Phone Work Phone Mobile Phone    Adele Villarreal (Spouse) [phone] -- [phone]        Macie Hickey, YOHAN   Respiratory Therapist      Progress Notes   Signed   Date of Service:  01/18/21 1638   Creation Time:  01/18/21 1638            Signed             Show:Clear all  [x]Manual[x]Template[]Copied    Added by:  [x]Macie Hickey, YOHAN    []Shady for details  Exercise Oximetry     Patient Name:Moe Villarreal   MRN: [id number]   Date: 01/18/21                                                                                                     ROOM AIR " BASELINE   SpO2% 85   Heart Rate    Blood Pressure       EXERCISE ON ROOM AIR SpO2% EXERCISE ON O2 @  2LPM SpO2%   1 MINUTE   1 MINUTE 85   2 MINUTES   2 MINUTES 87   3 MINUTES   3 MINUTES 87   4 MINUTES   4 MINUTES 88   5 MINUTES   5 MINUTES 89   6 MINUTES   6 MINUTES 90                                                                                                                 Distance Walked   Distance Walked   Dyspnea (Stevie Scale)   Dyspnea (Stevie Scale)   Fatigue (Stevie Scale)   Fatigue (Stevie Scale)   SpO2% Post Exercise  93 SpO2% Post Exercise   HR Post Exercise   HR Post Exercise   Time to Recovery  5 min Time to Recovery      Comments: pts room air SpO2 was 85%,  Placed pt on 2LNC and started to walk, slowly the pts SpO2 increased to 90% over the course of 6 minutes. Pt did not complain of shortness of air while ambulating on the 2LNC.  pts SpO2 on 2LNC after walk and resting was 93%.        Macie Hickey, YOHAN  1/18/2021  16:41 EST                 Blood Gas, Arterial - [LAB76] (Order [id number])  Order  Date: 1/18/2021 Department: [address] PEDIATRICS Released By: Interface, Poct Results To Melvin (auto-released) Authorizing: Pantera Wilson DO   Reprint Order Requisition    Blood Gas, Arterial - (Order #[id number]) on 1/18/21       Contains abnormal data Blood Gas, Arterial -  Order: [id number]  Status:  Final result   Visible to patient:  No (not released) Next appt:  None  Specimen Information: Arterial Blood        Component   Ref Range & Units 1d ago 5d ago   Site  Right Radial  Right Radial    Skyler's Test  Positive  Positive    pH, Arterial   7.350 - 7.450 pH units 7.426  7.549High     pCO2, Arterial   35.0 - 48.0 mm Hg 33.5Low   35.0    pO2, Arterial   83.0 - 108.0 mm Hg 48.7Low   68.5Low     HCO3, Arterial   21.0 - 28.0 mmol/L 22.1  30.6High     Base Excess, Arterial   0.0 - 3.0 mmol/L -1.8Low   8.0High  CM    Comment: Serial Number: [id number]:  [id number]   O2 Saturation,  Arterial   94.0 - 98.0 % 85.4Low   95.6    CO2 Content   22 - 29 mmol/L 23.1  31.7High     Barometric Pressure for Blood Gas    CM    Comment: N/A   Modality  Room Air  Cannula    FIO2   % 21  32    Hemodilution  No  No    Resulting Agency [facility]         Specimen Collected: 21 16:13 Last Resulted: 21 16:17 Lab Flowsheet Order Details View Encounter Lab and Collection Details Routing Result History      CM=Additional comments           Result Read / Acknowledged     Acknowledge result  No acknowledgement history exists for this order.   Lab Component SmartPhrase Guide    Blood Gas, Arterial - (Order #[id number]) on 21   Other Results from 2021     POC Glucose Once  Final result 2021    Comprehensive Metabolic Panel  Final result 2021    CK  Final result 2021    Ferritin  Final result 2021    C-reactive Protein  Final result 2021    Magnesium  Final result 2021    Phosphorus  Final result 2021    CBC Auto Differential  Final result 2021    POC Glucose Once  Final result 2021    POC Glucose Once  Final result 2021    POC Glucose Once  Final result 2021    POC Glucose Once  Final result 2021    Comprehensive Metabolic Panel  Final result 2021    CK  Final result 2021    Ferritin  Final result 2021    Lactate Dehydrogenase  Final result 2021    D-dimer, Quantitative  Final result 2021    Fibrinogen  Final result 2021    C-reactive Protein  Final result 2021    Magnesium  Final result 2021   (important suggestion)  Warning: Additional results from 2021 are available but are not displayed in this report.            History & Physical      Jordi Padgett MD at 21 [phone]                [facility] Services      Patient Name: Moe Lora  : 1945  MRN: [id number]  Primary Care Physician: Provider, No Known  Date of admission: 2021    Patient Care  "Team:  Provider, No Known as PCP - General          Subjective   History Present Illness     Chief Complaint:   Chief Complaint   Patient presents with   • Exposure To Known Illness     History of Present Illness  75-year-old  male with history of countless medical problems as outlined below, presented to the ED complaining of increasing shortness of breath over the past several days.  States shortness of breath is much worse on exertion.  Patient was diagnosed with Covid symptomatic over the past 7 to 8 days.  He also has productive cough with \"nasty phlegm \", fever, sweating, and nausea without vomiting.  He denies other complaint.    Emergency department course:  Febrile with initial temperature 103.1, hemodynamically stable, no acute distress.  Physical examination per ED physician was significant for presence of rhonchi at the bases.  ABG analysis on FiO2 32% showed a pH of 7.549, PCO2 35, PO2 68.5 and O2 saturation 95.6.  Labs were significant for blood glucose level 189, creatinine 1.79, BUN 36, and GFR 37.  WBC count 6.9 with neutrophils 94.1% and lymphocyte 1.7%.  Lactate level was normal at 1.9.  Chest x-ray reported \". There are bilateral airspace opacities which are consistent with pneumonia and/or pulmonary edema. 2. Cardiomegaly\".  Patient was given 1000 mg Tylenol, 800 mg ibuprofen, 750 mg IV levofloxacin and placed on supplemental O2 with 2 L nasal cannula.    ROS  Please refer to HPI. All other systems were reviewed and were negative.     Personal History     Past Medical History:   Past Medical History:   Diagnosis Date   • AF (atrial fibrillation) (CMS/[facility])     PT TO HAVE CARDIAC ABLATION AFTER THIS EYE SURGERY 8/2018, BY DR CORTEZ   • AICD (automatic cardioverter/defibrillator) present    • Alcohol abuse    • Arthritis    • Borderline diabetes    • Bradycardia     AICD   • Carotid stenosis    • CHF (congestive heart failure) (CMS/[facility])    • CKD (chronic kidney disease)    • Coronary artery " disease    • Depression    • Diabetes (CMS/HCC)    • GERD (gastroesophageal reflux disease)    • Hearing loss     HEARING AID BILAT   • Hx of CABG    • Hyperlipidemia    • Hypertension    • Ischemic cardiomyopathy    • LBBB (left bundle branch block)     PER [facility] HX   • MI (myocardial infarction) (CMS/HCC)     SEVERAL   • MRSA carrier     PT [facility], [facility]FECTION CONTROL CALLED/MARTHA   • On anticoagulant therapy    • Paresthesia     PT WITH OCCASIONAL FINGERTIP TINGLING, CONSTANT NUMBNESS BILAT FEET - PT STATES WORKUP IN PROGRESS FOR VASCULAR BYPASS   • Prosthetic eye globe     LEFT   • Pulmonary nodule     biopsied 2018?   • Sleep apnea with use of continuous positive airway pressure (CPAP)    • Stroke (CMS/[facility])     HAD BRAIN BLEED AFTER HIGH DOSES WARFARIN   • Valvular heart disease    • Vascular disease     LOWER EXTREMITIES       Surgical History:      Past Surgical History:   Procedure Laterality Date   • CARDIAC DEFIBRILLATOR PLACEMENT  02/2018    DEAM PLACED FIRST ONE THAT BECAME INFECTED; SECOND PLACED BY DR LAZARO   • CAROTID ENDARTERECTOMY Left    • CORONARY ARTERY BYPASS GRAFT  2006    ABE   • EYE ENUCLEATION Left 8/2/2018    Procedure: LEFT REMOVAL OF ORBITAL IMPLANT LEFT SECONDARY VOLUME IMPLANT, DERMIS FAT GRAFT, DISLA SUTURE;  Surgeon: Moe Arguello MD;  [place] [facility] [place] [facility] [place]  Service: Ophthalmology   • INTRAOCULAR PROSTHESES INSERTION      5 EYE SURGERIES, DR FUAD VALENZUELA           Family History: family history is not on file. Otherwise pertinent FHx was reviewed and unremarkable.     Social History:  reports that he quit smoking about 16 years ago. His smoking use included cigarettes. He has a 120.00 pack-year smoking history. He has never used smokeless tobacco. He reports current alcohol use of about 56.0 standard drinks of alcohol per week. He reports that he does not use drugs.      Medications:  Prior to Admission medications    Medication Sig Start Date  End Date Taking? Authorizing Provider   allopurinol (ZYLOPRIM) 300 MG tablet Take 300 mg by mouth Daily. 2/17/18   Carmelo Spain MD   aspirin 81 MG chewable tablet Chew 81 mg Daily. Stopped preop, last dose 7/18/18    Carmelo Spain MD   atorvastatin (LIPITOR) 40 MG tablet Take 40 mg by mouth Every Night. 2/16/18   Carmelo Spain MD   carvedilol (COREG) 25 MG tablet Take 37.5 mg by mouth Every 12 (Twelve) Hours. 2/20/18   Carmelo Spain MD   dabigatran etexilate (PRADAXA) 150 MG capsu Take 150 mg by mouth 2 (Two) Times a Day. Pt states was instructed to stop preop on 7/30/18 2/23/18   Carmelo Spain MD   diphenhydrAMINE-acetaminophen (TYLENOL PM)  MG tablet per tablet Take 1 tablet by mouth At Night As Needed for Sleep.    Carmelo Spain MD   erythromycin (ROMYCIN) 5 MG/GM ophthalmic ointment Gently apply to sutures twice a day 8/2/18   Moe Arguello MD   furosemide (LASIX) 80 MG tablet Take 80 mg by mouth 2 (Two) Times a Day. 2/16/18   Carmelo Spain MD   HYDROcodone-acetaminophen (NORCO) 7.5-325 MG per tablet Take 1 tablet by mouth Every 6 (Six) Hours As Needed for Moderate Pain . 8/2/18   Heber Church Jr., MD   isosorbide mononitrate (IMDUR) 60 MG 24 hr tablet Take 60 mg by mouth Daily.    Carmelo Spain MD   lisinopril (PRINIVIL,ZESTRIL) 40 MG tablet Take 40 mg by mouth Daily. 2/17/18   Carmelo Spain MD   omeprazole (priLOSEC) 20 MG capsule Take 20 mg by mouth Every Night.    Carmelo Spain MD       Allergies:    Allergies   Allergen Reactions   • Azithromycin Dermatitis   • Clopidogrel Anaphylaxis   • Codeine Dermatitis   • Cephalexin Dermatitis     Pt unsure of this allergy       Objective   Objective     Vital Signs  Temp:  [100.1 °F (37.8 °C)-103.1 °F (39.5 °C)] 100.1 °F (37.8 °C)  Heart Rate:  [70-79] 70  Resp:  [18-20] 18  BP: (110-119)/(65-68) 110/68  SpO2:  [97 %-100 %] 97 %  on  Flow (L/min):  [4] 4;   Device  (Oxygen Therapy): nasal cannula  Body mass index is 42.19 kg/m².    Physical Exam  General: Morbidly obese, alert and oriented x3, no acute distress.   Eyes:  Show anicteric sclerae, moist conjunctivae with no lig lag; PERRLA.  HENT:  Normocephalic, prosthetic left eye, hard of hearing, moist oral mucosa.  Neck: Short and thick, no bruit, no JVP, no thyroid or lymph node enlargement, trachea central,   Lungs:  Good air entry.  Few bibasilar crackles.  No rhonchi or wheezing.  Heart: RRR, no murmur or rub.   Abdomen:  Soft, not tender, not distended, no organomegaly, bowel sounds positive.   Extremities: No leg edema or joint swelling, no calf tenderness, normal range of movement, pedal pulses intact.   Skin: No rash, lesions, or ulcers.  Normal texture and turgor.  Neurology:  Grossly intact.   Psychiatric exam: Pleasant, cooperative, appropriate mood and affect, intact judgment and insight.      Diagnostic data review:  I have personally reviewed most recent lab results, microbiology results and radiology images and interpretations and agree with findings, most notably: .    Results from last 7 days   Lab Units 01/14/21  1825   WBC 10*3/mm3 6.90   HEMOGLOBIN g/dL 12.1*   HEMATOCRIT % 37.3*   PLATELETS 10*3/mm3 128*     Results from last 7 days   Lab Units 01/14/21 2004 01/14/21  1825   SODIUM mmol/L  --  137   POTASSIUM mmol/L  --  3.7   CHLORIDE mmol/L  --  93*   CO2 mmol/L  --  33.0*   BUN mg/dL  --  36*   CREATININE mg/dL  --  1.79*   GLUCOSE mg/dL  --  189*   CALCIUM mg/dL  --  9.6   LACTATE mmol/L 1.9  --    PROCALCITONIN ng/mL  --  0.29*     Estimated Creatinine Clearance: 29.5 mL/min (A) (by C-G formula based on SCr of 1.79 mg/dL (H)).  Brief Urine Lab Results     None          Microbiology Results (last 10 days)     ** No results found for the last 240 hours. **          ECG/EMG Results (most recent)     None                    Xr Chest 1 View    Result Date: 1/14/2021  1. There are bilateral airspace  opacities which are consistent with pneumonia and/or pulmonary edema. 2. Cardiomegaly.  Electronically Signed By-Kristi Menon MD On:1/14/2021 7:01 PM This report was finalized on [id number] by  Kristi Menon MD.        Estimated Creatinine Clearance: 29.5 mL/min (A) (by C-G formula based on SCr of 1.79 mg/dL (H)).    Assessment/Plan   Assessment/Plan       Active Hospital Problems    Diagnosis  POA   • Sepsis (CMS/[facility]) [A41.9]  Yes   • Acute kidney injury superimposed on CKD (CMS/[facility]) [N17.9, N18.9]  Yes      Resolved Hospital Problems   No resolved problems to display.     Assessment:  1.  COVID-19 infection with possible pneumonia.    2.  SAMIR on stage III CKD-likely 2/2 diuretic use.    3.  Uncontrolled type II DM.    4.  History of atrial fibrillation-s/p ablation-on anticoagulation with Pradaxa.    5.  Status post AICD placement.    6.  CAD/Hx of MI-s/p CABG.    7.  Hypertension.    8.  Hyperlipidemia.    9.  Obstructive sleep apnea.    10.  CHF-on diuretic.    11.  Morbid obesity.    Plan:  -Admission to Covid unit.  -Implement COVID-19 protocol with disease progression monitoring labs.  -Consult pharmacy for remdesivir therapy.  Start Decadron, vitamin C, zinc, and vitamin D.  -Continue empiric antibiotic coverage with Levaquin pending the blood and urine culture results.  -Check procalcitonin, CRP, liver enzymes, proBNP, and respiratory infection panel.  -Hold furosemide and lisinopril. Avoid nephrotoxic medications.  Monitor renal function.  If it worsens then will consult nephrology.  -Continue patient's other home medications.  Accu-Chek with basal and correctional insulins.    VTE Prophylaxis -   Mechanical Order History:     None      Pharmalogical Order History:      Ordered     Dose Route Frequency Stop    Signed and Held  dabigatran etexilate (PRADAXA) capsule 150 mg      150 mg PO 2 Times Daily --                CODE STATUS:    Code Status and Medical Interventions:   Ordered at: 01/14/21 6328      Code Status:    CPR     Medical Interventions (Level of Support Prior to Arrest):    Full       This patient has been examined wearing appropriate Personal Protective Equipment and discussed with hospital infection control department. 01/14/21      I discussed the patient's findings and my recommendations with patient.      Signature:Electronically signed by Jordi Padgett MD, 01/15/21, 1:03 AM GEORGE.      [facility] Team    Electronically signed by Jordi Padgett MD at 01/15/21 0357

## 2021-01-20 ENCOUNTER — READMISSION MANAGEMENT (OUTPATIENT)
Dept: CALL CENTER | Facility: HOSPITAL | Age: 76
End: 2021-01-20

## 2021-01-20 LAB — BACTERIA SPEC AEROBE CULT: NORMAL

## 2021-01-20 NOTE — PROGRESS NOTES
Case Management Discharge Note      [facility]                          Final Discharge Disposition Code: 01 - home or self-care

## 2021-01-20 NOTE — OUTREACH NOTE
Prep Survey      Responses   [facility] discharged from?  [facility]   Is LACE score < 7 ?  No   Emergency Room discharge w/ pulse ox?  No   Eligibility  Readm Mgmt   Discharge diagnosis  Acute hypoxemic respiratory failure secondary to COVID-19 pneumonia   Does the patient have one of the following disease processes/diagnoses(primary or secondary)?  COVID-19   Does the patient have Home health ordered?  No   Is there a DME ordered?  No   Prep survey completed?  Yes          Maria Teresa Conklin RN

## 2021-01-20 NOTE — OUTREACH NOTE
COVID-19 Week 1 Survey      Responses   [facility] patient discharged from?  [facility]   Does the patient have one of the following disease processes/diagnoses(primary or secondary)?  COVID-19   COVID-19 underlying condition?  None   Call Number  Call 1   Week 1 Call successful?  Yes   Call start time  1427   Call end time  1449   Discharge diagnosis  Acute hypoxemic respiratory failure secondary to COVID-19 pneumonia   Meds reviewed with patient/caregiver?  Yes   Is the patient having any side effects they believe may be caused by any medication additions or changes?  No   Does the patient have all medications ordered at discharge?  No   What is keeping the patient from filling the prescriptions?  -- [will be able to  [facility]]   Nursing Interventions  No intervention needed   Is the patient taking all medications as directed (includes completed medication regime)?  Yes   Does the patient have a primary care provider?   Yes   Does the patient have an appointment with their PCP or specialist within 7 days of discharge?  Greater than 7 days   What is preventing the patient from scheduling follow up appointments within 7 days of discharge?  Earlier appointment not available   Nursing Interventions  Verified appointment date/time/provider   Has the patient kept scheduled appointments due by today?  N/A   Has home health visited the patient within 72 hours of discharge?  N/A   Psychosocial issues?  No   Did the patient receive a copy of their discharge instructions?  Yes   Did the patient receive a copy of COVID-19 specific instructions?  Yes   Nursing interventions  Reviewed instructions with patient   What is the patient's perception of their health status since discharge?  Improving   Does the patient have any of the following symptoms?  Shortness of breath [SOB occasional and dissipates quickly with inhaler]   Nursing Interventions  Nurse provided patient education   Pulse Ox  monitoring  Intermittent   Pulse Ox device source  Patient   O2 Sat comments  high 90's with 2 liters O2    O2 Sat: education provided  Sat levels, Monitoring frequency, When to seek care   O2 Sat education comments  pt has clear understanding of parameters of safe O2 readings   Is the patient/caregiver able to teach back steps to recovery at home?  Set small, achievable goals for return to baseline health, Rest and rebuild strength, gradually increase activity, Practice good oral hygiene, Eat a well-balance diet   Is the patient/caregiver able to teach back the hierarchy of who to call/visit for symptoms/problems? PCP, Specialist, Home health nurse, Urgent Care, ED, 911  Yes   COVID-19 call completed?  Yes          Brittany Roque RN

## 2021-01-21 ENCOUNTER — READMISSION MANAGEMENT (OUTPATIENT)
Dept: CALL CENTER | Facility: HOSPITAL | Age: 76
End: 2021-01-21

## 2021-01-21 NOTE — OUTREACH NOTE
COVID-19 Week 1 Survey      Responses   [facility] patient discharged from?  [facility]   Does the patient have one of the following disease processes/diagnoses(primary or secondary)?  COVID-19   COVID-19 underlying condition?  None   Call Number  Call 2   Week 1 Call successful?  Yes   Call start time  1411   Call end time  1416   Discharge diagnosis  Acute hypoxemic respiratory failure secondary to COVID-19 pneumonia   Meds reviewed with patient/caregiver?  Yes   Is the patient having any side effects they believe may be caused by any medication additions or changes?  No   Does the patient have all medications ordered at discharge?  Yes   Nursing Interventions  No intervention needed   Is the patient taking all medications as directed (includes completed medication regime)?  Yes   Does the patient have a primary care provider?   Yes   Does the patient have an appointment with their PCP or specialist within 7 days of discharge?  Greater than 7 days   Has the patient kept scheduled appointments due by today?  N/A   Comments  in 2 weeks   Psychosocial issues?  No   Did the patient receive a copy of their discharge instructions?  Yes   Did the patient receive a copy of COVID-19 specific instructions?  Yes   Nursing interventions  Reviewed instructions with patient   What is the patient's perception of their health status since discharge?  Improving   Does the patient have any of the following symptoms?  Cough, Shortness of breath   Nursing Interventions  Nurse provided patient education   Pulse Ox monitoring  Intermittent   Pulse Ox device source  Patient   O2 Sat comments  90% on 2L   O2 Sat: education provided  Sat levels, Monitoring frequency, When to seek care   O2 Sat education comments  pt has clear understanding of parameters of safe O2 readings   Is the patient/caregiver able to teach back steps to recovery at home?  Set small, achievable goals for return to baseline health, Rest and rebuild strength,  gradually increase activity, Practice good oral hygiene, Eat a well-balance diet   If the patient is a current smoker, are they able to teach back resources for cessation?  Not a smoker   Is the patient/caregiver able to teach back the hierarchy of who to call/visit for symptoms/problems? PCP, Specialist, Home health nurse, Urgent Care, ED, 911  Yes   COVID-19 call completed?  Yes   Wrap up additional comments  pt doing ok, stated he is doing well.          Yue Chiang, RN

## 2021-01-22 ENCOUNTER — READMISSION MANAGEMENT (OUTPATIENT)
Dept: CALL CENTER | Facility: HOSPITAL | Age: 76
End: 2021-01-22

## 2021-01-22 NOTE — OUTREACH NOTE
COVID-19 Week 1 Survey      Responses   [facility] patient discharged from?  [facility]   Does the patient have one of the following disease processes/diagnoses(primary or secondary)?  COVID-19   COVID-19 underlying condition?  None   Call Number  Call 3   Week 1 Call successful?  No   Discharge diagnosis  Acute hypoxemic respiratory failure secondary to COVID-19 pneumonia          Yue Chiang RN

## 2021-01-26 ENCOUNTER — READMISSION MANAGEMENT (OUTPATIENT)
Dept: CALL CENTER | Facility: HOSPITAL | Age: 76
End: 2021-01-26

## 2021-01-26 NOTE — OUTREACH NOTE
COVID-19 Week 2 Survey      Responses   [facility] patient discharged from?  [facility]   Does the patient have one of the following disease processes/diagnoses(primary or secondary)?  COVID-19   COVID-19 underlying condition?  None   Call Number  Call 1   COVID-19 Week 2: Call 1 attempt successful?  No   Discharge diagnosis  Acute hypoxemic respiratory failure secondary to COVID-19 pneumonia          Chana So RN

## 2024-10-15 ENCOUNTER — OFFICE VISIT (OUTPATIENT)
Dept: WOUND CARE | Facility: HOSPITAL | Age: 79
End: 2024-10-15
Payer: OTHER GOVERNMENT

## (undated) DEVICE — SUT VIC 5/0 S14 18IN J571G

## (undated) DEVICE — Device

## (undated) DEVICE — SWABSTK SKINPREP PVPI LF PK/3

## (undated) DEVICE — 5.0MM DIAMOND ROUND BUR

## (undated) DEVICE — SUT VIC 5/0 P3 18IN J493G

## (undated) DEVICE — IMMOB HD UNIV CLR DISP

## (undated) DEVICE — WIPE INST MEROCEL

## (undated) DEVICE — CONTAINER,SPECIMEN,OR STERILE,4OZ: Brand: MEDLINE

## (undated) DEVICE — 1010 S-DRAPE TOWEL DRAPE 10/BX: Brand: STERI-DRAPE™

## (undated) DEVICE — NDL HYPO PRECISIONGLIDE REG 25G 1 1/2

## (undated) DEVICE — GLV SURG BIOGEL SENSR LTX PF SZ7.5

## (undated) DEVICE — GAUZE,SPONGE,FLUFF,6"X6.75",STRL,10/TRAY: Brand: MEDLINE

## (undated) DEVICE — GOWN,NON-REINFORCED,SIRUS,SET IN SLV,XXL: Brand: MEDLINE

## (undated) DEVICE — CROUCH CORNEAL PROTECTOR: Brand: BAUSCH + LOMB

## (undated) DEVICE — PK ENT 40

## (undated) DEVICE — STERILE COTTON TIP 6IN 10PK: Brand: MEDLINE

## (undated) DEVICE — ABSORBABLE SUTURE: Brand: MILD CHROMIC GUT

## (undated) DEVICE — GLV SURG BIOGEL ECLPS LTX PF 7.5

## (undated) DEVICE — ELECTRD BLD EZ CLN MOD 2.5IN